# Patient Record
Sex: MALE | Race: WHITE | Employment: OTHER | URBAN - METROPOLITAN AREA
[De-identification: names, ages, dates, MRNs, and addresses within clinical notes are randomized per-mention and may not be internally consistent; named-entity substitution may affect disease eponyms.]

---

## 2017-10-31 PROBLEM — H35.373 MACULAR PUCKERING, BILATERAL: Status: ACTIVE | Noted: 2017-10-31

## 2017-10-31 PROBLEM — H02.831 DERMATOCHALASIS OF BOTH UPPER EYELIDS: Status: ACTIVE | Noted: 2017-10-31

## 2017-10-31 PROBLEM — H04.123 DRY EYE SYNDROME OF BILATERAL LACRIMAL GLANDS: Status: ACTIVE | Noted: 2017-10-31

## 2017-10-31 PROBLEM — H01.00A BLEPHARITIS OF UPPER AND LOWER EYELIDS OF BOTH EYES, UNSPECIFIED TYPE: Status: ACTIVE | Noted: 2017-10-31

## 2017-10-31 PROBLEM — H02.834 DERMATOCHALASIS OF BOTH UPPER EYELIDS: Status: ACTIVE | Noted: 2017-10-31

## 2017-10-31 PROBLEM — H01.00B BLEPHARITIS OF UPPER AND LOWER EYELIDS OF BOTH EYES, UNSPECIFIED TYPE: Status: ACTIVE | Noted: 2017-10-31

## 2017-10-31 PROBLEM — H25.13 NUCLEAR SCLEROTIC CATARACT OF BOTH EYES: Status: ACTIVE | Noted: 2017-10-31

## 2024-07-16 ENCOUNTER — APPOINTMENT (OUTPATIENT)
Dept: CT IMAGING | Facility: HOSPITAL | Age: 84
End: 2024-07-16
Attending: STUDENT IN AN ORGANIZED HEALTH CARE EDUCATION/TRAINING PROGRAM
Payer: MEDICARE

## 2024-07-16 ENCOUNTER — APPOINTMENT (OUTPATIENT)
Dept: CT IMAGING | Facility: HOSPITAL | Age: 84
End: 2024-07-16
Attending: NEUROLOGICAL SURGERY
Payer: MEDICARE

## 2024-07-16 ENCOUNTER — APPOINTMENT (OUTPATIENT)
Dept: INTERVENTIONAL RADIOLOGY/VASCULAR | Facility: HOSPITAL | Age: 84
End: 2024-07-16
Attending: NEUROLOGICAL SURGERY
Payer: MEDICARE

## 2024-07-16 ENCOUNTER — ANESTHESIA EVENT (OUTPATIENT)
Dept: INTERVENTIONAL RADIOLOGY/VASCULAR | Facility: HOSPITAL | Age: 84
End: 2024-07-16
Payer: MEDICARE

## 2024-07-16 ENCOUNTER — HOSPITAL ENCOUNTER (INPATIENT)
Facility: HOSPITAL | Age: 84
LOS: 4 days | Discharge: HOME HEALTH | End: 2024-07-20
Attending: STUDENT IN AN ORGANIZED HEALTH CARE EDUCATION/TRAINING PROGRAM | Admitting: HOSPITALIST
Payer: MEDICARE

## 2024-07-16 ENCOUNTER — APPOINTMENT (OUTPATIENT)
Dept: GENERAL RADIOLOGY | Facility: HOSPITAL | Age: 84
End: 2024-07-16
Attending: STUDENT IN AN ORGANIZED HEALTH CARE EDUCATION/TRAINING PROGRAM
Payer: MEDICARE

## 2024-07-16 ENCOUNTER — OFFICE VISIT (OUTPATIENT)
Dept: FAMILY MEDICINE CLINIC | Facility: CLINIC | Age: 84
End: 2024-07-16
Payer: MEDICARE

## 2024-07-16 VITALS
DIASTOLIC BLOOD PRESSURE: 58 MMHG | SYSTOLIC BLOOD PRESSURE: 106 MMHG | HEIGHT: 64 IN | TEMPERATURE: 98 F | OXYGEN SATURATION: 100 % | HEART RATE: 60 BPM | WEIGHT: 129 LBS | BODY MASS INDEX: 22.02 KG/M2 | RESPIRATION RATE: 16 BRPM

## 2024-07-16 DIAGNOSIS — I63.9 ACUTE CVA (CEREBROVASCULAR ACCIDENT) (HCC): Primary | ICD-10-CM

## 2024-07-16 DIAGNOSIS — H60.541 ACUTE ECZEMATOID OTITIS EXTERNA OF RIGHT EAR: ICD-10-CM

## 2024-07-16 DIAGNOSIS — H61.21 IMPACTED CERUMEN OF RIGHT EAR: Primary | ICD-10-CM

## 2024-07-16 LAB
ALBUMIN SERPL-MCNC: 4.3 G/DL (ref 3.2–4.8)
ALBUMIN/GLOB SERPL: 1.6 {RATIO} (ref 1–2)
ALP LIVER SERPL-CCNC: 104 U/L
ALT SERPL-CCNC: 24 U/L
ANION GAP SERPL CALC-SCNC: 8.2 MMOL/L (ref 0–18)
ANTIBODY SCREEN: NEGATIVE
APTT PPP: 29.4 SECONDS (ref 23–36)
AST SERPL-CCNC: 26 U/L (ref ?–34)
BASOPHILS # BLD AUTO: 0.04 X10(3) UL (ref 0–0.2)
BASOPHILS NFR BLD AUTO: 0.7 %
BILIRUB SERPL-MCNC: 0.5 MG/DL (ref 0.2–1.1)
BUN BLD-MCNC: 20 MG/DL (ref 9–23)
CALCIUM BLD-MCNC: 9.1 MG/DL (ref 8.7–10.4)
CHLORIDE SERPL-SCNC: 103 MMOL/L (ref 98–112)
CHOLEST SERPL-MCNC: 204 MG/DL (ref ?–200)
CO2 SERPL-SCNC: 26.8 MMOL/L (ref 21–32)
CREAT BLD-MCNC: 1.22 MG/DL
EGFRCR SERPLBLD CKD-EPI 2021: 59 ML/MIN/1.73M2 (ref 60–?)
EOSINOPHIL # BLD AUTO: 0.19 X10(3) UL (ref 0–0.7)
EOSINOPHIL NFR BLD AUTO: 3.1 %
ERYTHROCYTE [DISTWIDTH] IN BLOOD BY AUTOMATED COUNT: 12.9 %
EST. AVERAGE GLUCOSE BLD GHB EST-MCNC: 111 MG/DL (ref 68–126)
GLOBULIN PLAS-MCNC: 2.7 G/DL (ref 2.8–4.4)
GLUCOSE BLD-MCNC: 104 MG/DL (ref 70–99)
GLUCOSE BLD-MCNC: 99 MG/DL (ref 70–99)
HBA1C MFR BLD: 5.5 % (ref ?–5.7)
HCT VFR BLD AUTO: 39.3 %
HDLC SERPL-MCNC: 52 MG/DL (ref 40–59)
HGB BLD-MCNC: 13.6 G/DL
IMM GRANULOCYTES # BLD AUTO: 0 X10(3) UL (ref 0–1)
IMM GRANULOCYTES NFR BLD: 0 %
INR BLD: 1.05 (ref 0.8–1.2)
LDLC SERPL CALC-MCNC: 137 MG/DL (ref ?–100)
LYMPHOCYTES # BLD AUTO: 2.11 X10(3) UL (ref 1–4)
LYMPHOCYTES NFR BLD AUTO: 34.4 %
MCH RBC QN AUTO: 31.3 PG (ref 26–34)
MCHC RBC AUTO-ENTMCNC: 34.6 G/DL (ref 31–37)
MCV RBC AUTO: 90.3 FL
MONOCYTES # BLD AUTO: 0.57 X10(3) UL (ref 0.1–1)
MONOCYTES NFR BLD AUTO: 9.3 %
NEUTROPHILS # BLD AUTO: 3.22 X10 (3) UL (ref 1.5–7.7)
NEUTROPHILS # BLD AUTO: 3.22 X10(3) UL (ref 1.5–7.7)
NEUTROPHILS NFR BLD AUTO: 52.5 %
NONHDLC SERPL-MCNC: 152 MG/DL (ref ?–130)
OSMOLALITY SERPL CALC.SUM OF ELEC: 289 MOSM/KG (ref 275–295)
PLATELET # BLD AUTO: 151 10(3)UL (ref 150–450)
POTASSIUM SERPL-SCNC: 3.7 MMOL/L (ref 3.5–5.1)
PROT SERPL-MCNC: 7 G/DL (ref 5.7–8.2)
PROTHROMBIN TIME: 13.7 SECONDS (ref 11.6–14.8)
RBC # BLD AUTO: 4.35 X10(6)UL
RH BLOOD TYPE: POSITIVE
RH BLOOD TYPE: POSITIVE
SODIUM SERPL-SCNC: 138 MMOL/L (ref 136–145)
TRIGL SERPL-MCNC: 81 MG/DL (ref 30–149)
TROPONIN I SERPL HS-MCNC: 8.22 NG/L
VLDLC SERPL CALC-MCNC: 15 MG/DL (ref 0–30)
WBC # BLD AUTO: 6.1 X10(3) UL (ref 4–11)

## 2024-07-16 PROCEDURE — 99292 CRITICAL CARE ADDL 30 MIN: CPT | Performed by: INTERNAL MEDICINE

## 2024-07-16 PROCEDURE — 70496 CT ANGIOGRAPHY HEAD: CPT | Performed by: STUDENT IN AN ORGANIZED HEALTH CARE EDUCATION/TRAINING PROGRAM

## 2024-07-16 PROCEDURE — 99202 OFFICE O/P NEW SF 15 MIN: CPT | Performed by: NURSE PRACTITIONER

## 2024-07-16 PROCEDURE — 71045 X-RAY EXAM CHEST 1 VIEW: CPT | Performed by: STUDENT IN AN ORGANIZED HEALTH CARE EDUCATION/TRAINING PROGRAM

## 2024-07-16 PROCEDURE — 70498 CT ANGIOGRAPHY NECK: CPT | Performed by: STUDENT IN AN ORGANIZED HEALTH CARE EDUCATION/TRAINING PROGRAM

## 2024-07-16 PROCEDURE — B3181ZZ FLUOROSCOPY OF BILATERAL INTERNAL CAROTID ARTERIES USING LOW OSMOLAR CONTRAST: ICD-10-PCS | Performed by: NEUROLOGICAL SURGERY

## 2024-07-16 PROCEDURE — 3E03317 INTRODUCTION OF OTHER THROMBOLYTIC INTO PERIPHERAL VEIN, PERCUTANEOUS APPROACH: ICD-10-PCS | Performed by: STUDENT IN AN ORGANIZED HEALTH CARE EDUCATION/TRAINING PROGRAM

## 2024-07-16 PROCEDURE — 99291 CRITICAL CARE FIRST HOUR: CPT | Performed by: INTERNAL MEDICINE

## 2024-07-16 PROCEDURE — 70450 CT HEAD/BRAIN W/O DYE: CPT | Performed by: STUDENT IN AN ORGANIZED HEALTH CARE EDUCATION/TRAINING PROGRAM

## 2024-07-16 PROCEDURE — 99223 1ST HOSP IP/OBS HIGH 75: CPT | Performed by: HOSPITALIST

## 2024-07-16 PROCEDURE — 0042T CT STROKE (DAWN) CTA BRAIN/CTA NECK+PERF(CPT=70496/70498/0042T): CPT | Performed by: STUDENT IN AN ORGANIZED HEALTH CARE EDUCATION/TRAINING PROGRAM

## 2024-07-16 PROCEDURE — 70450 CT HEAD/BRAIN W/O DYE: CPT | Performed by: NEUROLOGICAL SURGERY

## 2024-07-16 PROCEDURE — 99291 CRITICAL CARE FIRST HOUR: CPT | Performed by: NEUROLOGICAL SURGERY

## 2024-07-16 PROCEDURE — 03CG3ZZ EXTIRPATION OF MATTER FROM INTRACRANIAL ARTERY, PERCUTANEOUS APPROACH: ICD-10-PCS | Performed by: NEUROLOGICAL SURGERY

## 2024-07-16 RX ORDER — MELATONIN
3 NIGHTLY
COMMUNITY

## 2024-07-16 RX ORDER — ONDANSETRON 2 MG/ML
4 INJECTION INTRAMUSCULAR; INTRAVENOUS EVERY 6 HOURS PRN
Status: DISCONTINUED | OUTPATIENT
Start: 2024-07-16 | End: 2024-07-20

## 2024-07-16 RX ORDER — POTASSIUM CHLORIDE 20 MEQ/1
40 TABLET, EXTENDED RELEASE ORAL ONCE
Status: COMPLETED | OUTPATIENT
Start: 2024-07-16 | End: 2024-07-17

## 2024-07-16 RX ORDER — ONDANSETRON 2 MG/ML
4 INJECTION INTRAMUSCULAR; INTRAVENOUS ONCE AS NEEDED
Status: DISCONTINUED | OUTPATIENT
Start: 2024-07-16 | End: 2024-07-16

## 2024-07-16 RX ORDER — ALPRAZOLAM 0.25 MG/1
0.25 TABLET ORAL 3 TIMES DAILY PRN
Status: DISCONTINUED | OUTPATIENT
Start: 2024-07-16 | End: 2024-07-20

## 2024-07-16 RX ORDER — GABAPENTIN 300 MG/1
300 CAPSULE ORAL 3 TIMES DAILY
Status: DISCONTINUED | OUTPATIENT
Start: 2024-07-16 | End: 2024-07-16

## 2024-07-16 RX ORDER — POTASSIUM CHLORIDE 20 MEQ/1
TABLET, EXTENDED RELEASE ORAL
Status: COMPLETED
Start: 2024-07-16 | End: 2024-07-16

## 2024-07-16 RX ORDER — QUETIAPINE FUMARATE 25 MG/1
25 TABLET, FILM COATED ORAL NIGHTLY
Status: DISCONTINUED | OUTPATIENT
Start: 2024-07-16 | End: 2024-07-20

## 2024-07-16 RX ORDER — MELATONIN
3 NIGHTLY PRN
Status: DISCONTINUED | OUTPATIENT
Start: 2024-07-16 | End: 2024-07-20

## 2024-07-16 RX ORDER — PHENYLEPHRINE HCL 10 MG/ML
VIAL (ML) INJECTION AS NEEDED
Status: DISCONTINUED | OUTPATIENT
Start: 2024-07-16 | End: 2024-07-16 | Stop reason: SURG

## 2024-07-16 RX ORDER — DEXAMETHASONE SODIUM PHOSPHATE 4 MG/ML
4 VIAL (ML) INJECTION ONCE AS NEEDED
Status: DISCONTINUED | OUTPATIENT
Start: 2024-07-16 | End: 2024-07-16

## 2024-07-16 RX ORDER — LABETALOL HYDROCHLORIDE 5 MG/ML
10 INJECTION, SOLUTION INTRAVENOUS EVERY 10 MIN PRN
Status: DISCONTINUED | OUTPATIENT
Start: 2024-07-16 | End: 2024-07-16

## 2024-07-16 RX ORDER — FAMOTIDINE 10 MG/ML
20 INJECTION, SOLUTION INTRAVENOUS ONCE AS NEEDED
Status: ACTIVE | OUTPATIENT
Start: 2024-07-16 | End: 2024-07-16

## 2024-07-16 RX ORDER — ONDANSETRON 2 MG/ML
4 INJECTION INTRAMUSCULAR; INTRAVENOUS EVERY 6 HOURS PRN
Status: DISCONTINUED | OUTPATIENT
Start: 2024-07-16 | End: 2024-07-16

## 2024-07-16 RX ORDER — SODIUM CHLORIDE 9 MG/ML
INJECTION, SOLUTION INTRAVENOUS CONTINUOUS PRN
Status: DISCONTINUED | OUTPATIENT
Start: 2024-07-16 | End: 2024-07-16 | Stop reason: SURG

## 2024-07-16 RX ORDER — FAMOTIDINE 10 MG/ML
20 INJECTION, SOLUTION INTRAVENOUS ONCE AS NEEDED
Status: DISCONTINUED | OUTPATIENT
Start: 2024-07-16 | End: 2024-07-16

## 2024-07-16 RX ORDER — METOCLOPRAMIDE HYDROCHLORIDE 5 MG/ML
10 INJECTION INTRAMUSCULAR; INTRAVENOUS EVERY 8 HOURS PRN
Status: DISCONTINUED | OUTPATIENT
Start: 2024-07-16 | End: 2024-07-16

## 2024-07-16 RX ORDER — MEXILETINE HYDROCHLORIDE 150 MG/1
150 CAPSULE ORAL 3 TIMES DAILY
Status: DISCONTINUED | OUTPATIENT
Start: 2024-07-16 | End: 2024-07-20

## 2024-07-16 RX ORDER — DIPHENHYDRAMINE HYDROCHLORIDE 50 MG/ML
12.5 INJECTION INTRAMUSCULAR; INTRAVENOUS AS NEEDED
Status: ACTIVE | OUTPATIENT
Start: 2024-07-16 | End: 2024-07-17

## 2024-07-16 RX ORDER — MEMANTINE HYDROCHLORIDE 10 MG/1
10 TABLET ORAL 2 TIMES DAILY
COMMUNITY

## 2024-07-16 RX ORDER — IODIXANOL 320 MG/ML
150 INJECTION, SOLUTION INTRAVASCULAR
Status: COMPLETED | OUTPATIENT
Start: 2024-07-16 | End: 2024-07-16

## 2024-07-16 RX ORDER — NOREPINEPHRINE BITARTRATE 1 MG/ML
INJECTION, SOLUTION INTRAVENOUS
Status: DISCONTINUED
Start: 2024-07-16 | End: 2024-07-16 | Stop reason: WASHOUT

## 2024-07-16 RX ORDER — LABETALOL HYDROCHLORIDE 5 MG/ML
5 INJECTION, SOLUTION INTRAVENOUS EVERY 5 MIN PRN
Status: DISCONTINUED | OUTPATIENT
Start: 2024-07-16 | End: 2024-07-16

## 2024-07-16 RX ORDER — OFLOXACIN 3 MG/ML
5 SOLUTION AURICULAR (OTIC) 2 TIMES DAILY
Qty: 5 ML | Refills: 0 | Status: SHIPPED | OUTPATIENT
Start: 2024-07-16 | End: 2024-07-21

## 2024-07-16 RX ORDER — ATORVASTATIN CALCIUM 40 MG/1
40 TABLET, FILM COATED ORAL NIGHTLY
Status: DISCONTINUED | OUTPATIENT
Start: 2024-07-16 | End: 2024-07-16

## 2024-07-16 RX ORDER — MELATONIN
1000 DAILY
COMMUNITY

## 2024-07-16 RX ORDER — MEMANTINE HYDROCHLORIDE 5 MG/1
TABLET ORAL
Status: COMPLETED
Start: 2024-07-16 | End: 2024-07-16

## 2024-07-16 RX ORDER — ACETAMINOPHEN 650 MG/1
650 SUPPOSITORY RECTAL EVERY 4 HOURS PRN
Status: DISCONTINUED | OUTPATIENT
Start: 2024-07-16 | End: 2024-07-16

## 2024-07-16 RX ORDER — ECHINACEA PURPUREA EXTRACT 125 MG
1 TABLET ORAL
Status: DISCONTINUED | OUTPATIENT
Start: 2024-07-16 | End: 2024-07-20

## 2024-07-16 RX ORDER — DIVALPROEX SODIUM 125 MG/1
125 TABLET, DELAYED RELEASE ORAL 3 TIMES DAILY
COMMUNITY

## 2024-07-16 RX ORDER — MELATONIN
3 NIGHTLY
Status: DISCONTINUED | OUTPATIENT
Start: 2024-07-16 | End: 2024-07-20

## 2024-07-16 RX ORDER — SODIUM CHLORIDE 9 MG/ML
INJECTION, SOLUTION INTRAVENOUS CONTINUOUS
Status: DISCONTINUED | OUTPATIENT
Start: 2024-07-16 | End: 2024-07-16

## 2024-07-16 RX ORDER — POTASSIUM CHLORIDE 750 MG/1
10 TABLET, FILM COATED, EXTENDED RELEASE ORAL DAILY
COMMUNITY

## 2024-07-16 RX ORDER — ACETAMINOPHEN 325 MG/1
650 TABLET ORAL EVERY 4 HOURS PRN
Status: DISCONTINUED | OUTPATIENT
Start: 2024-07-16 | End: 2024-07-20

## 2024-07-16 RX ORDER — ACETAMINOPHEN 325 MG/1
650 TABLET ORAL EVERY 4 HOURS PRN
Status: DISCONTINUED | OUTPATIENT
Start: 2024-07-16 | End: 2024-07-16

## 2024-07-16 RX ORDER — METHYLPREDNISOLONE SODIUM SUCCINATE 125 MG/2ML
125 INJECTION, POWDER, LYOPHILIZED, FOR SOLUTION INTRAMUSCULAR; INTRAVENOUS ONCE AS NEEDED
Status: DISCONTINUED | OUTPATIENT
Start: 2024-07-16 | End: 2024-07-16

## 2024-07-16 RX ORDER — AMIODARONE HYDROCHLORIDE 200 MG/1
200 TABLET ORAL DAILY
COMMUNITY

## 2024-07-16 RX ORDER — DOCUSATE SODIUM 100 MG/1
100 CAPSULE, LIQUID FILLED ORAL 2 TIMES DAILY
COMMUNITY

## 2024-07-16 RX ORDER — ACETAMINOPHEN 500 MG
500 TABLET ORAL 3 TIMES DAILY
COMMUNITY

## 2024-07-16 RX ORDER — BENZONATATE 100 MG/1
200 CAPSULE ORAL 3 TIMES DAILY PRN
Status: DISCONTINUED | OUTPATIENT
Start: 2024-07-16 | End: 2024-07-20

## 2024-07-16 RX ORDER — SODIUM CHLORIDE 9 MG/ML
INJECTION, SOLUTION INTRAVENOUS CONTINUOUS
Status: DISCONTINUED | OUTPATIENT
Start: 2024-07-16 | End: 2024-07-20

## 2024-07-16 RX ORDER — ASPIRIN 325 MG
325 TABLET, DELAYED RELEASE (ENTERIC COATED) ORAL DAILY
Status: DISCONTINUED | OUTPATIENT
Start: 2024-07-16 | End: 2024-07-16

## 2024-07-16 RX ORDER — DIPHENHYDRAMINE HYDROCHLORIDE 50 MG/ML
50 INJECTION INTRAMUSCULAR; INTRAVENOUS ONCE AS NEEDED
Status: DISCONTINUED | OUTPATIENT
Start: 2024-07-16 | End: 2024-07-16

## 2024-07-16 RX ORDER — ASPIRIN 300 MG/1
150 SUPPOSITORY RECTAL DAILY
Status: DISCONTINUED | OUTPATIENT
Start: 2024-07-16 | End: 2024-07-17

## 2024-07-16 RX ORDER — MEMANTINE HYDROCHLORIDE 10 MG/1
10 TABLET ORAL 2 TIMES DAILY
Status: DISCONTINUED | OUTPATIENT
Start: 2024-07-16 | End: 2024-07-20

## 2024-07-16 RX ORDER — NALOXONE HYDROCHLORIDE 0.4 MG/ML
0.08 INJECTION, SOLUTION INTRAMUSCULAR; INTRAVENOUS; SUBCUTANEOUS AS NEEDED
Status: ACTIVE | OUTPATIENT
Start: 2024-07-16 | End: 2024-07-17

## 2024-07-16 RX ORDER — DIVALPROEX SODIUM 125 MG/1
125 TABLET, DELAYED RELEASE ORAL 3 TIMES DAILY
Status: DISCONTINUED | OUTPATIENT
Start: 2024-07-16 | End: 2024-07-20

## 2024-07-16 RX ORDER — ACETAMINOPHEN 500 MG
500 TABLET ORAL EVERY 4 HOURS PRN
Status: DISCONTINUED | OUTPATIENT
Start: 2024-07-16 | End: 2024-07-16

## 2024-07-16 RX ORDER — ASPIRIN 300 MG/1
300 SUPPOSITORY RECTAL DAILY
Status: DISCONTINUED | OUTPATIENT
Start: 2024-07-16 | End: 2024-07-16

## 2024-07-16 RX ORDER — METOCLOPRAMIDE HYDROCHLORIDE 5 MG/ML
5 INJECTION INTRAMUSCULAR; INTRAVENOUS EVERY 8 HOURS PRN
Status: DISCONTINUED | OUTPATIENT
Start: 2024-07-16 | End: 2024-07-16

## 2024-07-16 RX ORDER — BISACODYL 10 MG
10 SUPPOSITORY, RECTAL RECTAL
Status: DISCONTINUED | OUTPATIENT
Start: 2024-07-16 | End: 2024-07-20

## 2024-07-16 RX ORDER — SODIUM CHLORIDE, SODIUM LACTATE, POTASSIUM CHLORIDE, CALCIUM CHLORIDE 600; 310; 30; 20 MG/100ML; MG/100ML; MG/100ML; MG/100ML
INJECTION, SOLUTION INTRAVENOUS CONTINUOUS
Status: DISCONTINUED | OUTPATIENT
Start: 2024-07-16 | End: 2024-07-16

## 2024-07-16 RX ORDER — MEXILETINE HYDROCHLORIDE 150 MG/1
150 CAPSULE ORAL 3 TIMES DAILY
COMMUNITY

## 2024-07-16 RX ORDER — AMIODARONE HYDROCHLORIDE 200 MG/1
200 TABLET ORAL DAILY
Status: DISCONTINUED | OUTPATIENT
Start: 2024-07-17 | End: 2024-07-20

## 2024-07-16 RX ORDER — HEPARIN SODIUM 5000 [USP'U]/ML
INJECTION, SOLUTION INTRAVENOUS; SUBCUTANEOUS
Status: COMPLETED
Start: 2024-07-16 | End: 2024-07-16

## 2024-07-16 RX ORDER — METHYLPREDNISOLONE SODIUM SUCCINATE 125 MG/2ML
125 INJECTION, POWDER, LYOPHILIZED, FOR SOLUTION INTRAMUSCULAR; INTRAVENOUS ONCE AS NEEDED
Status: ACTIVE | OUTPATIENT
Start: 2024-07-16 | End: 2024-07-16

## 2024-07-16 RX ORDER — ONDANSETRON 2 MG/ML
8 INJECTION INTRAMUSCULAR; INTRAVENOUS EVERY 6 HOURS PRN
Status: DISCONTINUED | OUTPATIENT
Start: 2024-07-16 | End: 2024-07-16

## 2024-07-16 RX ORDER — POLYETHYLENE GLYCOL 3350 17 G/17G
17 POWDER, FOR SOLUTION ORAL DAILY PRN
Status: DISCONTINUED | OUTPATIENT
Start: 2024-07-16 | End: 2024-07-20

## 2024-07-16 RX ORDER — QUETIAPINE FUMARATE 25 MG/1
25 TABLET, FILM COATED ORAL NIGHTLY
COMMUNITY

## 2024-07-16 RX ORDER — ACETAMINOPHEN 650 MG/1
650 SUPPOSITORY RECTAL EVERY 4 HOURS PRN
Status: DISCONTINUED | OUTPATIENT
Start: 2024-07-16 | End: 2024-07-20

## 2024-07-16 RX ORDER — LABETALOL HYDROCHLORIDE 5 MG/ML
10 INJECTION, SOLUTION INTRAVENOUS EVERY 10 MIN PRN
Status: DISCONTINUED | OUTPATIENT
Start: 2024-07-16 | End: 2024-07-17

## 2024-07-16 RX ORDER — FUROSEMIDE 20 MG/1
20 TABLET ORAL DAILY
COMMUNITY

## 2024-07-16 RX ORDER — ATORVASTATIN CALCIUM 40 MG/1
40 TABLET, FILM COATED ORAL NIGHTLY
Status: DISCONTINUED | OUTPATIENT
Start: 2024-07-16 | End: 2024-07-20

## 2024-07-16 RX ORDER — HYDRALAZINE HYDROCHLORIDE 20 MG/ML
10 INJECTION INTRAMUSCULAR; INTRAVENOUS EVERY 2 HOUR PRN
Status: DISCONTINUED | OUTPATIENT
Start: 2024-07-16 | End: 2024-07-16

## 2024-07-16 RX ORDER — DIPHENHYDRAMINE HYDROCHLORIDE 50 MG/ML
50 INJECTION INTRAMUSCULAR; INTRAVENOUS ONCE AS NEEDED
Status: ACTIVE | OUTPATIENT
Start: 2024-07-16 | End: 2024-07-16

## 2024-07-16 RX ORDER — QUETIAPINE FUMARATE 50 MG/1
50 TABLET, EXTENDED RELEASE ORAL NIGHTLY
COMMUNITY

## 2024-07-16 RX ORDER — HYDRALAZINE HYDROCHLORIDE 20 MG/ML
10 INJECTION INTRAMUSCULAR; INTRAVENOUS EVERY 2 HOUR PRN
Status: DISCONTINUED | OUTPATIENT
Start: 2024-07-16 | End: 2024-07-17

## 2024-07-16 RX ORDER — SENNOSIDES 8.6 MG
17.2 TABLET ORAL NIGHTLY PRN
Status: DISCONTINUED | OUTPATIENT
Start: 2024-07-16 | End: 2024-07-20

## 2024-07-16 RX ADMIN — PHENYLEPHRINE HCL 100 MCG: 10 MG/ML VIAL (ML) INJECTION at 17:49:00

## 2024-07-16 RX ADMIN — PHENYLEPHRINE HCL 100 MCG: 10 MG/ML VIAL (ML) INJECTION at 17:32:00

## 2024-07-16 RX ADMIN — SODIUM CHLORIDE: 9 INJECTION, SOLUTION INTRAVENOUS at 17:01:00

## 2024-07-16 RX ADMIN — PHENYLEPHRINE HCL 100 MCG: 10 MG/ML VIAL (ML) INJECTION at 17:47:00

## 2024-07-16 NOTE — PROGRESS NOTES
Patient presents to clinic for wax in right ear. He was seen at another clinic for wax removal in preparation for hearing aid fitting and wax was not fully removed. He denies pain but notes hearing loss. Has tried nothing at home for treatment.  Past Medical History:    Age-related nuclear cataract, bilateral    Cataract    Dry eye    H/O cardiac radiofrequency ablation    @ [facility]    Heart disease    History of permanent cardiac pacemaker placement    Hyperlipidemia    Macular pucker    Posterior vitreous detachment    Ptosis       No past surgical history on file.  Current Outpatient Medications on File Prior to Visit   Medication Sig Dispense Refill    gabapentin 300 MG Oral Cap Take 300 mg by mouth 3 (three) times daily.      metoprolol tartrate 10mg/ml Oral Suspension       diazepam (VALIUM) 10 MG Oral Tab       Hydrocodone-Acetaminophen (VICODIN OR)       Atorvastatin Calcium (LIPITOR OR) Take by mouth.      amiodarone 5mg/ml Oral Suspension        No current facility-administered medications on file prior to visit.     /58   Pulse 60   Temp 97.7 °F (36.5 °C)   Resp 16   Ht 5' 4\" (1.626 m)   Wt 129 lb (58.5 kg)   SpO2 100%   BMI 22.14 kg/m²     GENERAL: well developed, well nourished,in no apparent distress  SKIN: no rashes,no suspicious lesions  HEENT: atraumatic, normocephalic. Bilateral ears with cerumen present.   NEURO: CN 2-12 grossly intact.  Procedure:  The patient has impacted cerumen which covers bilater eardrums and pt has complaints of change in hearing. After risks and benefits discussed and verbal consent given the impacted cerumen is removed with curette and water suction with good results. Right canal with white waxy debris noted and mild swelling in canal. Cotton placed in ear canal for drainage and comfort. Patient tolerated procedure well. Discussed conservative methods of cerumen management with patient.   Montrell was seen today for ear problem.    Diagnoses and all  orders for this visit:    Impacted cerumen of right ear    Acute eczematoid otitis externa of right ear    Other orders  -     ofloxacin 0.3 % Otic Solution; Place 5 drops into the right ear 2 (two) times daily for 5 days.      Patient Instructions   Please use Ofloxin drops to RIGHT ear twice daily for five days.  Follow up with hearing specialist as needed.      Patient verbalized understanding and agrees to plan.

## 2024-07-16 NOTE — ANESTHESIA PREPROCEDURE EVALUATION
PRE-OP EVALUATION    Patient Name: Montrell Roberto    Admit Diagnosis: Acute CVA (cerebrovascular accident) (HCC) [I63.9]    Pre-op Diagnosis: * No pre-op diagnosis entered *        Anesthesia Procedure: IR INTRA ARTERIAL STROKE INTERVENTION    * No surgeons found in log *    Pre-op vitals reviewed.  Temp: 97.7 °F (36.5 °C)  Pulse: 72  Resp: 18  BP: 111/63  SpO2: 98 %  Body mass index is 22.36 kg/m².    Current medications reviewed.  Hospital Medications:   sodium chloride 0.9% infusion   Intravenous Continuous    [COMPLETED] tenecteplase (TNKase) injection 15 mg  0.25 mg/kg Intravenous Once    [COMPLETED] heparin (Porcine) 5000 UNIT/ML injection        [COMPLETED] niCARdipine in sodium chloride 0.86% (carDENE) 20 mg/200mL infusion premix        [COMPLETED] iopamidol 76% (ISOVUE-370) injection for power injector  125 mL Intravenous ONCE PRN       Outpatient Medications:     Medications Prior to Admission   Medication Sig Dispense Refill Last Dose    ofloxacin 0.3 % Otic Solution Place 5 drops into the right ear 2 (two) times daily for 5 days. 5 mL 0     gabapentin 300 MG Oral Cap Take 300 mg by mouth 3 (three) times daily.       metoprolol tartrate 10mg/ml Oral Suspension        diazepam (VALIUM) 10 MG Oral Tab        Hydrocodone-Acetaminophen (VICODIN OR)        Atorvastatin Calcium (LIPITOR OR) Take by mouth.       amiodarone 5mg/ml Oral Suspension           Allergies: Other      Anesthesia Evaluation    Patient summary reviewed.    Anesthetic Complications  (-) history of anesthetic complications         GI/Hepatic/Renal    Negative GI/hepatic/renal ROS.                             Cardiovascular        Exercise tolerance: good     MET: >4                 (+) pacemaker/AICD                          Endo/Other    Negative endo/other ROS.                              Pulmonary    Negative pulmonary ROS.                       Neuro/Psych          (+) CVA                            History reviewed. No pertinent  surgical history.  Social History     Socioeconomic History    Marital status:    Tobacco Use    Smoking status: Never    Smokeless tobacco: Never   Substance and Sexual Activity    Alcohol use: No    Drug use: No     History   Drug Use No     Available pre-op labs reviewed.  Lab Results   Component Value Date    WBC 6.1 07/16/2024    RBC 4.35 07/16/2024    HGB 13.6 07/16/2024    HCT 39.3 07/16/2024    MCV 90.3 07/16/2024    MCH 31.3 07/16/2024    MCHC 34.6 07/16/2024    RDW 12.9 07/16/2024    .0 07/16/2024        Lab Results   Component Value Date    INR 1.05 07/16/2024         Airway      Mallampati: II  Mouth opening: >3 FB  TM distance: > 6 cm  Neck ROM: full Cardiovascular      Rhythm: regular  Rate: normal  (-) murmur   Dental             Pulmonary    Pulmonary exam normal.  Breath sounds clear to auscultation bilaterally.               Other findings              ASA: 4 and emergent  Plan: MAC   Patient does not meet NPO guidelines.        Comment: Implied consent - emergent procedure  Plan/risks discussed with: patient and Other                Present on Admission:  **None**

## 2024-07-16 NOTE — ED INITIAL ASSESSMENT (HPI)
Per EMS, last known well 07/16/2024 at 340 PM today, was walking around store with wife just fine. Got home and all of a sudden started slurring words, having left sided facial droop and arm weakness. EMS got there and patient was completely aphasic.

## 2024-07-16 NOTE — CONSULTS
[facility] [place]  Neurological Surgery Consultation Note    Montrell Roberto Patient Status:  Emergency    1940 MRN [id number]   [facility] INTERVENTIONAL SUITES Attending Jaun Singh MD   Hosp Day # 0 PCP Enma Clark MD     REASON FOR CONSULTATION:  Acute stroke    HISTORY OF PRESENT ILLNESS:  Montrell Roberto is a 84 year old male with an extensive cardiac history and a prior HIT+ who presented to the ED with acute onset left sided weakness. LKW 1540. NIHSS 20 initially. TNK administered. Repeat NIHSS 14. Head CT without hemorrhage. CTA with right M1 occlusion.      PAST MEDICAL HISTORY:  Past Medical History:    Age-related nuclear cataract, bilateral    Cataract    Dry eye    H/O cardiac radiofrequency ablation    @ [facility]    Heart disease    History of permanent cardiac pacemaker placement    Hyperlipidemia    Macular pucker    Posterior vitreous detachment    Ptosis       PAST SURGICAL HISTORY:  History reviewed. No pertinent surgical history.    FAMILY HISTORY:  family history includes Cataracts in an other family member.    SOCIAL HISTORY:   reports that he has never smoked. He has never used smokeless tobacco. He reports that he does not drink alcohol and does not use drugs.    ALLERGIES:  Allergies   Allergen Reactions    Heparin OTHER (SEE COMMENTS)     Family unsure, states it was at [facility]       MEDICATIONS:  Medications Prior to Admission   Medication Sig Dispense Refill Last Dose    ofloxacin 0.3 % Otic Solution Place 5 drops into the right ear 2 (two) times daily for 5 days. 5 mL 0     gabapentin 300 MG Oral Cap Take 300 mg by mouth 3 (three) times daily.       metoprolol tartrate 10mg/ml Oral Suspension        diazepam (VALIUM) 10 MG Oral Tab        Hydrocodone-Acetaminophen (VICODIN OR)        Atorvastatin Calcium (LIPITOR OR) Take by mouth.       amiodarone 5mg/ml Oral Suspension         Current  Facility-Administered Medications   Medication Dose Route Frequency    sodium chloride 0.9% infusion   Intravenous Continuous     Facility-Administered Medications Ordered in Other Encounters   Medication Dose Route Frequency    fentaNYL (Sublimaze) 50 mcg/mL injection   Intravenous PRN    phenylephrine (Brendan-Synephrine) 10 MG/ML injection   Intravenous PRN    sodium chloride 0.9% infusion   Intravenous Continuous PRN       REVIEW OF SYSTEMS:  A 10-point system was reviewed.  Pertinent positives and negatives are noted in HPI.      PHYSICAL EXAMINATION:  VITAL SIGNS: /63   Pulse 72   Resp 18   Wt 130 lb 4.7 oz (59.1 kg)   SpO2 98%   BMI 22.36 kg/m²   NEUROLOGICAL:    A&Ox 1, dysarthric  Right facial droop  No movement right leg  Right arm incoordinated, weak but antigravity   Right facial droop  Left neglect    DIAGNOSTIC DATA:   Lab Results   Component Value Date    WBC 6.1 07/16/2024    HGB 13.6 07/16/2024    HCT 39.3 07/16/2024    .0 07/16/2024    CREATSERUM 1.22 07/16/2024    BUN 20 07/16/2024     07/16/2024    K 3.7 07/16/2024     07/16/2024    CO2 26.8 07/16/2024    GLU 99 07/16/2024    CA 9.1 07/16/2024    ALB 4.3 07/16/2024    ALKPHO 104 07/16/2024    BILT 0.5 07/16/2024    TP 7.0 07/16/2024    AST 26 07/16/2024    ALT 24 07/16/2024    PTT 29.4 07/16/2024    INR 1.05 07/16/2024    PTP 13.7 07/16/2024    PGLU 104 07/16/2024       ASSESSMENT:  85yo male with a right M1 occlusion    I spoke to the patient, his wife, and his daughter (POA) about the current clinical situation and the plan of care. We discussed the risks, benefits, alternatives, goals, and expectations of stroke intervention. They agreed with proceeding as planned.    Plan:  Emergent stroke intervention    Ambrosio Godoy MD  Neurological Surgery  [facility]    Critical care time: 30 minutes prior to intervention

## 2024-07-16 NOTE — SIGNIFICANT EVENT
Stroke Alert initiated in ED  Last Know Normal at 1540 today  Pre-morbid MRS 3  Initial NIHSS 20 including L sided weakness/neglect, L sided facial droop, global aphasia, R gaze preference   Patient accompanied to CT dept  Case discussed with Dr Gr (ED), Dr Velez ([facility]), and Dr Godoy ()  Per Dr Velez, patient is a candidate for Tenecteplase  Thrombolytic administered in CT room at 1644  Repeat NIHSS obtained after Tenecteplase administration     NIH Stroke Scale after TNK administration   1a.  Level of consciousness: 0   1b. LOC questions:  0   1c. LOC commands: 0   2.  Best Gaze: 2 - R gaze preference   3. Visual: 0   4. Facial Palsy: 1 - L sided facial droop   5a. Motor left arm: 2 - Some effort against gravity   5b.  Motor right arm: 0   6a. Motor left le - No movement   6b.  Motor right le   7. Limb Ataxia: 0 - CHAZ   8.  Sensory: 2 - L side total sensory loss   9. Best Language:  0   10. Dysarthria: 1 - Slurred speech   11. Extinction and Inattention: 2 - Profound maria esther-inattention (L side)     Total:   14      Other symptoms include N/A    Dr Velez updated on patient’s status, imaging results, and repeat NIHSS  Per Dr Godoy, patient is a candidate for neuro intervention (M1 occlusion)  Spouse and daughter educated on stroke diagnosis, treatment, plan of care, and what to expect during hospitalization; all pertinent questions and concerns addressed  Procedure consents signed by patient's daughter Genesis; all pertinent questions and concerns addressed   Patient transported to NI lab immediately after completion of CT/CTA, CTP imaging     Of note: History and LKW provided by EMS and patient's spouse. Patient was traveling in the car with his wife when he suddenly developed confusion, L sided weakness, and L facial droop. Patient was taken home and spouse called 911. Upon arrival to ED, L sided weakness/neglect, L facial droop, confusion/agitation and global aphasia noted.      Patient was  reassessed after TNK administration. He was able to communicate while in CT dept and his speech further improved after administration of TNK. Upon reassessment, improvement in facial drooping was also noted; however, L sided weakness/neglect remained the same.     Stroke Alert timing affected by: N/A    Please refer to the Stroke Data Flowsheets for additional information and Stroke Alert response times.

## 2024-07-16 NOTE — PATIENT INSTRUCTIONS
Please use Ofloxin drops to RIGHT ear twice daily for five days.  Follow up with hearing specialist as needed.

## 2024-07-16 NOTE — CONSULTS
[facility]  Neurocritical Care Consult Note    Montrell Roberto Patient Status:  Emergency    1940 MRN [id number]   Location [facility] EMERGENCY DEPARTMENT Attending No att. providers found   Hosp Day # 0 PCP LYNDA GARCIA       Reason for Consultation:   Acute L sided weakness    HPI:   Patient is a 84 year old male with a h/o htn, hl, dm2, cad, arrhythmia and dilated cardiomyopathy s/p radiofrequency ablation and PPM, and dementia p/w acute onset L sided weakness and slurred speech c/f acute ischemic stroke . Pt noted by family to have acute onset slurred speech and L sided weakness thus brought to ED where w/u revealed NIHSS 20 and ct/cta with R m1 occlusion, thus given iv tnk and underwent emergent endovascular mechanical thrombectomy with tici 2b revascularization c/b R cervical ica non-flow limiting dissection, and pt was transferred to cnicu for further monitoring.     Past Medical History:    Age-related nuclear cataract, bilateral    Cataract    Dry eye    H/O cardiac radiofrequency ablation    @ [facility]    Heart disease    History of permanent cardiac pacemaker placement    Hyperlipidemia    Macular pucker    Posterior vitreous detachment    Ptosis       History reviewed. No pertinent surgical history.    (Not in a hospital admission)    Allergies   Allergen Reactions    Other UNKNOWN     heprin      Social History     Socioeconomic History    Marital status:    Tobacco Use    Smoking status: Never    Smokeless tobacco: Never   Substance and Sexual Activity    Alcohol use: No    Drug use: No     Family History   Problem Relation Age of Onset    Cataracts Other        Current Meds:  Current Facility-Administered Medications   Medication Dose Route Frequency    tenecteplase (TNKase) 50 MG injection           Review of Systems:     Constitutional:    Denies unusual weight loss or weight gain, fever/chills or night sweats.  HEENT:                Denies  changes in vision or difficulty swallowing.  Pulm:                    Denies dyspnea, cough, or sputum production  Cardiac:               Denies chest pain, palpitations or lower extremity edema.  GI:                         Denies constipation, heartburn or melena.  :                       Denies dysuria or hematuria.  Skin:                     Denies rashes or open areas.  Neuro:                  As per HPI    All other systems were reviewed and are negative.    Vitals:   Temp:  [97.7 °F (36.5 °C)] 97.7 °F (36.5 °C)  Pulse:  [60-70] 70  Resp:  [16] 16  BP: (106-116)/(58-65) 116/65  SpO2:  [97 %-100 %] 97 %  Body mass index is 22.36 kg/m².    Intake/Output:  No intake or output data in the 24 hours ending 07/16/24 1636    Physical Examination:   General- No acute distress  CV- RRR  Resp- CTA Bilat  Neuro-  Mental status- awake and alert, regards and follows commands, oriented x3, speech fluent  Cranial nerves- pupils equally round and reactive to light, extraocular muscles intact, face symmetric, visual fields full  Motor- 5/5 throughout  Sens-  Intact to light touch    Diagnostics:   No results found.    Lab Review   No results for input(s): \"NA\", \"K\", \"CL\", \"CO2\", \"GLU\", \"BUN\", \"CREATSERUM\" in the last 168 hours.  No results for input(s): \"WBC\", \"HGB\", \"PLT\" in the last 168 hours.    Assesment/Plan:     Neuro:  L sided weakness and slurred speech- 2/2 R m1 occlusion.  Pt with risk factors of htn, hl, dm2, cad, arrhythmia and dilated cardiomyopathy.  Now s/p iv tnk, cont post tnk protocol with neurochecks/pt/ot/st.   CT/CTA with R m1 occlusion thus take for emergent endovascular mechanical thrombectomy with tici 2b revascularization c/b R cervical ica non-flow limiting dissection, postop per ROSEY.  Exam with complete resolution of symptoms, back to baseline.   Rpt ct 24hrs post tnk (or sooner if any clinical decompensation) and complete stroke w/u (labs, tte etc).   Cardiac:  Htn/hl/cad/arrhythmia/dilated  cardiomyopathy- sbp goal 100-160 per ROSEY.  Check tte with bubble to eval for source of infarct, cont statin.  Pulmonary:  Stable on RA  Renal:  IVFs, monitor BUN/Cr  GI:  PO as toñito  Heme/ID:  Afebrile, no leukocytosis  Endocrine/Rheum:  Monitor glucose, sliding scale insulin prn  DVT Prophylaxis:  SCD’s    Goals of the Day: neurochecks, post tnk protocol   A total of 80 minutes of critical care time (exclusive of billable procedures) was administered to manage and/or prevent neurologic instability. This involved direct patient intervention, complex decision making, and/or extensive discussions with the patient, family, and clinical staff.    Thank you for allowing me to participate in the care of this patient.     Marito Velez MD, [facility]  Medical Director of System Neurosciences  Chief, Section of Neurocritical Care  [facility] [place]

## 2024-07-16 NOTE — ED QUICK NOTES
Pt is taken to CT with stroke navigator, pct, monitor and nurse. He is awake, vitals stable. Left sided deficit, vision favoring the right side. NIH to follow.

## 2024-07-16 NOTE — ED PROVIDER NOTES
Patient Seen in: [facility]      History     Chief Complaint   Patient presents with    Stroke     Stated Complaint: stroke    Subjective:   HPI    The patient presents to the ED with aphasia, left sided weakness that began approx 40 min ago. He and his wife were at [facility] when she noticed he was drooling and slurring his words. She took him home and called 911 as she was concerned he was having a stroke. Pt on arrival completely aphasic, does move left arm but does not move his left lower extremity.   Pt is not on blood thinners though he has a hx of cardiomyopathy with a PCM and defibrillator in place.     Objective:   Past Medical History:    Age-related nuclear cataract, bilateral    Cataract    Dry eye    H/O cardiac radiofrequency ablation    @ [facility]    Heart disease    History of permanent cardiac pacemaker placement    Hyperlipidemia    Macular pucker    Posterior vitreous detachment    Ptosis              History reviewed. No pertinent surgical history.             Social History     Socioeconomic History    Marital status:    Tobacco Use    Smoking status: Never    Smokeless tobacco: Never   Substance and Sexual Activity    Alcohol use: No    Drug use: No     Social Determinants of Health     Financial Resource Strain: Low Risk  (7/29/2021)    Received from [employer]    Overall Financial Resource Strain (CARDIA)     Difficulty of Paying Living Expenses: Not hard at all   Food Insecurity: No Food Insecurity (7/29/2021)    Received from [employer]    Hunger Vital Sign     Worried About Running Out of Food in the Last Year: Never true     Ran Out of Food in the Last Year: Never true   Transportation Needs: No Transportation Needs (7/29/2021)    Received from [employer]    PRAPARE - Transportation     Lack of Transportation (Medical): No     Lack of Transportation (Non-Medical): No   Housing Stability: Unknown (7/29/2021)    Received from  [facility]    Housing Stability Vital Sign     Unable to Pay for Housing in the Last Year: No     In the last 12 months, was there a time when you did not have a steady place to sleep or slept in a shelter (including now)?: No              Review of Systems    Positive for stated Chief Complaint: Stroke    Other systems are as noted in HPI.  Constitutional and vital signs reviewed.      All other systems reviewed and negative except as noted above.    Physical Exam     ED Triage Vitals [07/16/24 1634]   /65   Pulse 70   Resp 16   Temp    Temp src    SpO2 97 %   O2 Device None (Room air)       Current Vitals:   Vital Signs  BP: 111/63  Pulse: 72  Resp: 18    Oxygen Therapy  SpO2: 98 %  O2 Device: None (Room air)            Physical Exam  Vitals and nursing note reviewed.   Constitutional:       General: He is not in acute distress.     Appearance: Normal appearance.   HENT:      Nose: Nose normal.   Cardiovascular:      Rate and Rhythm: Normal rate and regular rhythm.      Pulses: Normal pulses.   Pulmonary:      Effort: Pulmonary effort is normal.   Abdominal:      General: Abdomen is flat. Bowel sounds are normal. There is no distension.      Palpations: Abdomen is soft.      Tenderness: There is no abdominal tenderness. There is no right CVA tenderness, left CVA tenderness, guarding or rebound.      Hernia: No hernia is present.   Musculoskeletal:         General: No swelling or tenderness. Normal range of motion.      Cervical back: Normal range of motion.   Skin:     General: Skin is warm and dry.   Neurological:      Mental Status: He is alert and oriented to person, place, and time. Mental status is at baseline.      Cranial Nerves: Facial asymmetry present.      Motor: Weakness present.   Psychiatric:         Mood and Affect: Mood normal.               ED Course     Labs Reviewed   POCT GLUCOSE - Abnormal; Notable for the following components:       Result Value    POC Glucose 104 (*)     All other  components within normal limits   PROTHROMBIN TIME (PT) - Normal   PTT, ACTIVATED - Normal   CBC WITH DIFFERENTIAL WITH PLATELET    Narrative:     The following orders were created for panel order CBC With Differential With Platelet.  Procedure                               Abnormality         Status                     ---------                               -----------         ------                     CBC W/ DIFFERENTIAL[[id number]]                              Final result                 Please view results for these tests on the individual orders.   COMP METABOLIC PANEL (14)   TROPONIN I HIGH SENSITIVITY   TYPE AND SCREEN    Narrative:     The following orders were created for panel order Type and screen.  Procedure                               Abnormality         Status                     ---------                               -----------         ------                     ABORH (Blood Type)[[id number]]                               In process                 Antibody Screen[[id number]]                                  In process                   Please view results for these tests on the individual orders.   ABORH (BLOOD TYPE)   ANTIBODY SCREEN   ABORH CONFIRMATION   CBC W/ DIFFERENTIAL                TNK/ NI Documentation:    Date/Time last known well:   N/A    NIHSS on presentation: N/A     Chief Complaint   Patient presents with    Stroke     IV Tenecteplase (TNK) administered: Yes; Risks, benefits and alternatives discussed with the patient and/or family.    Candidate for Endovascular thrombectomy (EVT): Yes     Disposition: There is no disposition on file for this visit.  CT STROKE (ALEX) CTA BRAIN/CTA NECK+PERF(CPT=70496/31996/0042T)    Result Date: 7/16/2024  CONCLUSION:   1. No significant stenosis or aneurysmal dilatation involving the major arterial structures in the neck.  2. Obstruction of the mid right M1 segment with associated marked perfusion abnormality suggesting marked ischemia  throughout the entire right MCA territory.  T-max greater than 6 seconds is 148 mL. Mismatch volume is 137 mL.  This critical result was discussed with Dr. Gr at 1708 hours on 7/16/2024. Read back was performed.    LOCATION:  [facility]   Dictated by (CST): Live Little MD on 7/16/2024 at 5:03 PM     Finalized by (CST): Live Little MD on 7/16/2024 at 5:08 PM       CT STROKE BRAIN (NO IV)(CPT=70450)    Result Date: 7/16/2024  CONCLUSION:  1. No acute intracranial hemorrhage or hydrocephalus. 2. Mild age indeterminate small vessel ischemic disease. If there is clinical concern for acute ischemia/infarction, an MRI of the brain would be recommended for further evaluation.  Critical results were discussed with Dr. Gr at 1643 hours on 7/16/2024. Critical results were read back.   LOCATION:  [facility]   Dictated by (CST): Stromberg, LeRoy, MD on 7/16/2024 at 4:39 PM     Finalized by (CST): Stromberg, LeRoy, MD on 7/16/2024 at 4:44 PM                      MDM      The differential includes the following  Acute CVA, acute intracranial hemorrhage both which are life-threatening etiologies, seizure    Pertinent comorbidities include  As listed above    Pertinent social history includes  As listed above    Labs      Imaging studies  I reviewed the images and my independent interpreation after review is no ICH. Additionaly, I reviewd the radiology report that states the following no ICH. CtA scan of the middle cerebral artery.    External data reviewed    Discussion of management with external providers    ER course  Patient's initial vitals  Admission disposition: 7/16/2024  4:58 PM           4:44 PM  Dr. Sainz of radiology has informed me there is no evidence of ICH on CT head  I have spoken with pt's wife about TNK administration - risks and benefit. She has provided consent.  Pt given TNK in CT by stroke navigator and ED pharmacists   Awaiting CTA results     4:57 PM  Is evidence of LVO patient be taken and I  will    5:09 PM  Neurointerventional to spoken with the patient's wife.  Patient taken and I lab from CT scanner.    A total of 35 minutes of critical care time (exclusive of billable procedures) was administered to manage the patient's critical imaging findings due to his complete vessel occlusion of the middle cerebral artery on CT angio, administration of TNK.  This involved direct patient intervention, complex decision making, and/or extensive discussions with the patient, family, and clinical staff.      Medical Decision Making      Disposition and Plan     Clinical Impression:  1. Acute CVA (cerebrovascular accident) (HCC)         Disposition:  Admit  7/16/2024  4:58 pm    Follow-up:  No follow-up provider specified.        Medications Prescribed:  Current Discharge Medication List                            Hospital Problems       Present on Admission  Date Reviewed: 7/16/2024            ICD-10-CM Noted POA    * (Principal) Acute CVA (cerebrovascular accident) (HCC) I63.9 7/16/2024 Unknown                    thrombectomy (EVT): Yes     Disposition: There is no disposition on file for this visit.  No results found.      MDM      The differential includes the following  Acute CVA, acute intracranial hemorrhage both which are life-threatening etiologies, seizure    Pertinent comorbidities include  As listed above    Pertinent social history includes  As listed above    Labs      Imaging studies  I reviewed the images and my independent interpreation after review is no ICH. Additionaly, I reviewd the radiology report that states the following no ICH. CtA scan of the middle cerebral artery.    External data reviewed    Discussion of management with external providers    ER course  Patient's initial vitals  Admission disposition: 7/16/2024  4:58 PM           4:44 PM  Dr. Sainz of radiology has informed me there is no evidence of ICH on CT head  I have spoken with pt's wife about TNK administration - risks and benefit. She has provided consent.  Pt given TNK in CT by stroke navigator and ED pharmacists   Awaiting CTA results     4:57 PM  Is evidence of LVO patient be taken and I will    5:09 PM  Neurointerventional to spoken with the patient's wife.  Patient taken and I lab from CT scanner.    A total of 35 minutes of critical care time (exclusive of billable procedures) was administered to manage the patient's critical imaging findings due to his complete vessel occlusion of the middle cerebral artery on CT angio, administration of TNK.  This involved direct patient intervention, complex decision making, and/or extensive discussions with the patient, family, and clinical staff.      Medical Decision Making      Disposition and Plan     Clinical Impression:  1. Acute CVA (cerebrovascular accident) (HCC)         Disposition:  Admit  7/16/2024  4:58 pm    Follow-up:  Mariann Wagner, APRN  [address]  [facility] [id number] [phone]    Follow up in 1 month(s)      Enma Clark MD  [address]  [facility] [address]  [phone]    Schedule an appointment as soon as possible for a visit      Leroy Petersen MD  [address]  [place] [address]  [phone]    Go in 1 month(s)  stroke follow up    Ronald Watkins MD  [address] [facility]  [address] [facility] [address]  [phone]    Follow up in 1 week(s)  Office will call you for follow up appt.          Medications Prescribed:  Discharge Medication List as of 7/20/2024 11:54 AM        START taking these medications    Details   aspirin 325 MG Oral Tab Take 1 tablet (325 mg total) by mouth at bedtime., Normal, Disp-30 tablet, R-1                               Hospital Problems       Present on Admission  Date Reviewed: 7/16/2024            ICD-10-CM Noted POA    * (Principal) Acute CVA (cerebrovascular accident) (HCC) I63.9 7/16/2024 Yes    Chronic systolic congestive heart failure (HCC) I50.22 7/18/2024 Unknown

## 2024-07-16 NOTE — ED QUICK NOTES
Dr. Barragan at CT, reviewing CT scan and confirmation that patient is to go to NI lab. Pt will be transferred from CT to NI lab. Family will be informed by Dr. Gr and Dr. Barragan to follow. Pt vitals are still stable, he is awake, neuro signs improving as documented in NIH scale.

## 2024-07-16 NOTE — ED QUICK NOTES
Pt is candidate for TNK, will be given in CT while continued monitoring is happening.  Pharmacist Moshe in CT as well as listed staff. Pt vitals stable, he is awake.

## 2024-07-17 ENCOUNTER — APPOINTMENT (OUTPATIENT)
Dept: CT IMAGING | Facility: HOSPITAL | Age: 84
End: 2024-07-17
Payer: MEDICARE

## 2024-07-17 ENCOUNTER — APPOINTMENT (OUTPATIENT)
Dept: CV DIAGNOSTICS | Facility: HOSPITAL | Age: 84
End: 2024-07-17
Payer: MEDICARE

## 2024-07-17 LAB
ANION GAP SERPL CALC-SCNC: 3 MMOL/L (ref 0–18)
BUN BLD-MCNC: 15 MG/DL (ref 9–23)
CALCIUM BLD-MCNC: 8.3 MG/DL (ref 8.7–10.4)
CHLORIDE SERPL-SCNC: 110 MMOL/L (ref 98–112)
CO2 SERPL-SCNC: 26 MMOL/L (ref 21–32)
CREAT BLD-MCNC: 0.94 MG/DL
EGFRCR SERPLBLD CKD-EPI 2021: 81 ML/MIN/1.73M2 (ref 60–?)
ERYTHROCYTE [DISTWIDTH] IN BLOOD BY AUTOMATED COUNT: 12.9 %
GLUCOSE BLD-MCNC: 100 MG/DL (ref 70–99)
GLUCOSE BLD-MCNC: 130 MG/DL (ref 70–99)
GLUCOSE BLD-MCNC: 151 MG/DL (ref 70–99)
GLUCOSE BLD-MCNC: 156 MG/DL (ref 70–99)
GLUCOSE BLD-MCNC: 84 MG/DL (ref 70–99)
GLUCOSE BLD-MCNC: 97 MG/DL (ref 70–99)
HCT VFR BLD AUTO: 30.9 %
HGB BLD-MCNC: 10.5 G/DL
MAGNESIUM SERPL-MCNC: 1.9 MG/DL (ref 1.6–2.6)
MCH RBC QN AUTO: 30.9 PG (ref 26–34)
MCHC RBC AUTO-ENTMCNC: 34 G/DL (ref 31–37)
MCV RBC AUTO: 90.9 FL
OSMOLALITY SERPL CALC.SUM OF ELEC: 288 MOSM/KG (ref 275–295)
PLATELET # BLD AUTO: 101 10(3)UL (ref 150–450)
POTASSIUM SERPL-SCNC: 4.1 MMOL/L (ref 3.5–5.1)
POTASSIUM SERPL-SCNC: 4.1 MMOL/L (ref 3.5–5.1)
RBC # BLD AUTO: 3.4 X10(6)UL
SODIUM SERPL-SCNC: 139 MMOL/L (ref 136–145)
WBC # BLD AUTO: 4.4 X10(3) UL (ref 4–11)

## 2024-07-17 PROCEDURE — 93306 TTE W/DOPPLER COMPLETE: CPT | Performed by: NURSE PRACTITIONER

## 2024-07-17 PROCEDURE — 99291 CRITICAL CARE FIRST HOUR: CPT | Performed by: INTERNAL MEDICINE

## 2024-07-17 PROCEDURE — 99292 CRITICAL CARE ADDL 30 MIN: CPT | Performed by: INTERNAL MEDICINE

## 2024-07-17 PROCEDURE — 70450 CT HEAD/BRAIN W/O DYE: CPT | Performed by: NURSE PRACTITIONER

## 2024-07-17 PROCEDURE — 99233 SBSQ HOSP IP/OBS HIGH 50: CPT | Performed by: HOSPITALIST

## 2024-07-17 RX ORDER — KETOTIFEN FUMARATE 0.35 MG/ML
1 SOLUTION/ DROPS OPHTHALMIC 2 TIMES DAILY
Status: DISCONTINUED | OUTPATIENT
Start: 2024-07-17 | End: 2024-07-20

## 2024-07-17 RX ORDER — ACETAMINOPHEN 325 MG/1
650 TABLET ORAL EVERY 4 HOURS PRN
Status: DISCONTINUED | OUTPATIENT
Start: 2024-07-17 | End: 2024-07-20

## 2024-07-17 RX ORDER — ONDANSETRON 2 MG/ML
4 INJECTION INTRAMUSCULAR; INTRAVENOUS EVERY 6 HOURS PRN
Status: DISCONTINUED | OUTPATIENT
Start: 2024-07-17 | End: 2024-07-20

## 2024-07-17 RX ORDER — HYDRALAZINE HYDROCHLORIDE 20 MG/ML
10 INJECTION INTRAMUSCULAR; INTRAVENOUS EVERY 2 HOUR PRN
Status: DISCONTINUED | OUTPATIENT
Start: 2024-07-17 | End: 2024-07-20

## 2024-07-17 RX ORDER — ASPIRIN 325 MG
325 TABLET ORAL NIGHTLY
Status: DISCONTINUED | OUTPATIENT
Start: 2024-07-17 | End: 2024-07-20

## 2024-07-17 RX ORDER — ENOXAPARIN SODIUM 100 MG/ML
40 INJECTION SUBCUTANEOUS DAILY
Status: DISCONTINUED | OUTPATIENT
Start: 2024-07-17 | End: 2024-07-17

## 2024-07-17 RX ORDER — LABETALOL HYDROCHLORIDE 5 MG/ML
10 INJECTION, SOLUTION INTRAVENOUS EVERY 10 MIN PRN
Status: DISCONTINUED | OUTPATIENT
Start: 2024-07-17 | End: 2024-07-20

## 2024-07-17 RX ORDER — ACETAMINOPHEN 650 MG/1
650 SUPPOSITORY RECTAL EVERY 4 HOURS PRN
Status: DISCONTINUED | OUTPATIENT
Start: 2024-07-17 | End: 2024-07-20

## 2024-07-17 NOTE — PLAN OF CARE
Stroke booklet given to patient; the following education was provided:     What is stroke  BEFAST - Stroke warning sings and symptoms  How to initiate EMS  Secondary stroke prevention and personalized risk factors: HLD  Lifestyle modifications (nutrition and exercise)  Post-stroke recovery and follow-up  Community resources (stroke support group and non-emergent stroke line)    Patient present during education, patient receptive to teachings. All pertinent questions and concerns were addressed.      RN Stroke Navigator team  [phone]

## 2024-07-17 NOTE — H&P
[facility]     Montrell Roberto Patient Status:  Inpatient    1940 MRN [id number]   Location [facility] 6NE-A Attending Jaun Singh MD   Hosp Day # 0 PCP Enma Clark MD     Chief Complaint: stroke    Subjective:    History of Present Illness:     Montrell Roberto is a 84 year old male with stroke like symptoms that started while [place] [employer] [place] [employer].  She noticed he was having droopy eyes and grasping at things in the air.  She drove home and noticed she couldn't get him out of the car, he was weak on left and was not speaking.  On way to hospital, he was very somnolent.  Now back to nearly baseline.    History/Other:    Past Medical History:  Past Medical History:    Age-related nuclear cataract, bilateral    Cataract    Dry eye    H/O cardiac radiofrequency ablation    @ [facility]    Heart disease    History of permanent cardiac pacemaker placement    Hyperlipidemia    Macular pucker    Posterior vitreous detachment    Ptosis     Past Surgical History:   History reviewed. No pertinent surgical history.   Family History:   Family History   Problem Relation Age of Onset    Cataracts Other        hypertension Social History:    reports that he has never smoked. He has never used smokeless tobacco. He reports that he does not drink alcohol and does not use drugs.     Allergies:   Allergies   Allergen Reactions    Heparin OTHER (SEE COMMENTS)     Family unsure, states it was at [facility]       Medications:    No current facility-administered medications on file prior to encounter.     Current Outpatient Medications on File Prior to Encounter   Medication Sig Dispense Refill    ofloxacin 0.3 % Otic Solution Place 5 drops into the right ear 2 (two) times daily for 5 days. 5 mL 0    gabapentin 300 MG Oral Cap Take 300 mg by mouth 3 (three) times daily.      metoprolol tartrate 10mg/ml Oral Suspension       diazepam (VALIUM) 10 MG  Oral Tab       Hydrocodone-Acetaminophen (VICODIN OR)       Atorvastatin Calcium (LIPITOR OR) Take by mouth.      amiodarone 5mg/ml Oral Suspension          Review of Systems:   A comprehensive 12 point review of systems was completed.    Pertinent positives and negatives noted in the HPI.    Objective:   Physical Exam:    /75 (BP Location: Right arm)   Pulse 60   Temp 97.9 °F (36.6 °C) (Temporal)   Resp 16   Wt 130 lb 4.7 oz (59.1 kg)   SpO2 100%   BMI 22.36 kg/m²   General: No acute distress, Alert  Respiratory: No rhonchi, no wheezes  Cardiovascular: S1, S2. Regular rate and rhythm  Abdomen: Soft, NT/ND, +BS  Neuro: No new focal deficits  Extremities: No edema      Results:    Labs:      Labs Last 24 Hours:    Recent Labs   Lab 07/16/24  1628   RBC 4.35   HGB 13.6   HCT 39.3   MCV 90.3   MCH 31.3   MCHC 34.6   RDW 12.9   NEPRELIM 3.22   WBC 6.1   .0       Recent Labs   Lab 07/16/24  1628   GLU 99   BUN 20   CREATSERUM 1.22   EGFRCR 59*   CA 9.1   ALB 4.3      K 3.7      CO2 26.8   ALKPHO 104   AST 26   ALT 24   BILT 0.5   TP 7.0       Lab Results   Component Value Date    INR 1.05 07/16/2024       Recent Labs   Lab 07/16/24  1628   TROPHS 8.22       No results for input(s): \"TROP\", \"PBNP\" in the last 168 hours.    No results for input(s): \"PCT\" in the last 168 hours.    Imaging: Imaging data reviewed in Epic.    Assessment & Plan:      #acute MCA right M1 occlusion s/p TNK and IR thrombectomy; symptoms resolved;  BP goals and timing aspirin deferred to neuroCC/neuroIR.  Statin; check lipids, A1c.    #dementia  #hx dilated cardiomyopathy with ablation and AICD/ppm-hold BB tonight; re-assess in AM to consider restarting if pulse and BP allow  #cad  #HTN    Addendum: low dose rectal aspirin for dissection per dr. Godoy  SBP goal 100-160    Quality:  DVT prophylaxis:  SCDs;  hold off lovenox until OK'd by neuroCC/neuroIR  Code Status: see chart  Mehta: NO  Mehta Duration (in days):  N/A  Central line: NO  Estimated discharge date: tbd    Plan of care discussed with patient and ER MD Jaun Mejia MD    Supplementary Documentation:             **Certification      PHYSICIAN Certification of Need for Inpatient Hospitalization - Initial Certification    Patient will require inpatient services that will reasonably be expected to span two midnight's based on the clinical documentation in H+P.   Based on patients current state of illness, I anticipate that, after discharge, patient will require TBD.

## 2024-07-17 NOTE — ANESTHESIA POSTPROCEDURE EVALUATION
[facility]    Montrell Roberto Patient Status:  Inpatient   Age/Gender 84 year old male MRN [id number]   Location [facility] 6NE-A Attending Jaun Singh MD   Hosp Day # 0 PCP Enma Clark MD       Anesthesia Post-op Note        Procedure Summary       Date: 07/16/24 Room / Location: [facility]    Anesthesia Start: 1701 Anesthesia Stop: 1851    Procedure: IR INTRA ARTERIAL STROKE INTERVENTION Diagnosis: (stroke)    Scheduled Providers:  Anesthesiologist: Gail Hopper MD    Anesthesia Type: MAC ASA Status: 4 - Emergent            Anesthesia Type: MAC    Vitals Value Taken Time   /58 07/16/24 2300   Temp 97.1 °F (36.2 °C) 07/16/24 2000   Pulse 60 07/16/24 2317   Resp 11 07/16/24 2317   SpO2 100 % 07/16/24 2317   Vitals shown include unfiled device data.    Patient Location: ICU    Anesthesia Type: MAC    Airway Patency: patent    Postop Pain Control: adequate    Mental Status: Other: (Alert and oriented X3, moving all extremities; conversing)    Nausea/Vomiting: none    Cardiopulmonary/Hydration status: stable euvolemic    Complications: no apparent anesthesia related complications    Postop vital signs: stable    Dental Exam: Unchanged from Preop    Sign out given to ICU staff.

## 2024-07-17 NOTE — PROGRESS NOTES
[facility]  Neurosurgery Progress Note    Montrell Roberto Patient Status:  Inpatient    1940 MRN [id number]   Location [facility] 6NE-A Attending Oliver Milan MD   Hosp Day # 1 PCP Enma Clark MD     Chief Complaint:  Acute stroke    Subjective:  Patient awake and alert. Able to lift left leg.     Objective/Physical Exam:    Vital Signs:  Blood pressure 115/71, pulse 60, temperature 98.1 °F (36.7 °C), temperature source Temporal, resp. rate 18, weight 133 lb 13.1 oz (60.7 kg), SpO2 100%.  Respiratory:  nonlabored  CV:  well perfused  General: NAD, Speech Fluent, Mood Appropriate  Neurologic:   A&Ox3  PERRL, EOMi, FS, TM  Full strength x 4, no drift  Skin: Dressings intact and dry, groin site stable    Labs:  Recent Labs   Lab 24  1628 24  0452   RBC 4.35 3.40*   HGB 13.6 10.5*   HCT 39.3 30.9*   MCV 90.3 90.9   MCH 31.3 30.9   MCHC 34.6 34.0   RDW 12.9 12.9   NEPRELIM 3.22  --    WBC 6.1 4.4   .0 101.0*       Recent Labs   Lab 24  1628 24  0452   GLU 99 84   BUN 20 15   CREATSERUM 1.22 0.94   EGFRCR 59* 81   CA 9.1 8.3*    139   K 3.7 4.1  4.1    110   CO2 26.8 26.0       Imaging:  IR INTRA ARTERIAL STROKE INTERVENTION    Result Date: 2024  CEREBRAL ANGIOGRAM PROCEDURE REPORT  PATIENT NAME: Montrell Roberto  DATE OF PROCEDURE: 2024  ATTENDING: Ambrosio Godoy MD  PREOPERATIVE DIAGNOSIS: 1. Right M1 occluison  POSTOPERATIVE DIAGNOSIS: 1. Right M1 occlusion  OPERATION: 1. Diagnostic cerebral angiogram 2. Ultrasound guided arterial access 3. Right middle cerebral artery mechanical thrombectomy  ANESTHESIA: MAC administered by anesthesiology  COMPLICATIONS: none  INDICATIONS: The patient is an 84 year old male who presented with acute onset left sided weakness and was found to have a right M1 occlusion. He was administered TNK. See medical record for further detail.   PROCEDURE: Following explanation of the benefits, risks and  alternatives for the procedure, informed consent was obtained from the patient's daughter who is his power of . The risks including but not limited to stroke, intracranial hemorrhage, vascular injury to the cervical or femoral vessels, groin hematoma, and death were discussed with the patient, his wife, and his daughter. A time-out was performed. Both groins were prepped in the usual sterile fashion using Chloroprep, and sterilely draped. Using ultrasound guidance (permanent image saved), a single wall puncture of the right femoral artery was performed; and a 8-Fr short sheath was inserted into the right common femoral artery. The sheath was then flushed with sterile flushes in the usual fashion.  Using coaxial technique, a Walrus balloon guide catheter with a Winchester-2 select catheter were advanced into the aortic arch, and with the aid of the roadmapping, digital fluoroscopy, and careful guidewire manipulation the right common carotid and right internal carotid arteries were catheterized. Upon each successive selective catheterization, digital subtraction angiography using the appropriate rate and volume of contrast in multiple projections was performed.   FINDINGS:  RIGHT COMMON CAROTID ARTERY (ROADMAP, PA, LATERAL, CERVICAL) The origins of the right internal and external carotid arteries are widely patent without evidence of ulceration or stenosis. The visualized portions of the external carotid artery and its branches are normal without evidence of ulceration or stenosis. There is no evidence of arteriovenous shunting.  RIGHT INTERNAL CAROTID ARTERY (DSA, PA, LATERAL, HEAD) There is a right M1 occlusion.  ENDOVASCULAR INTERVENTION: With the Walrus balloon guide catheter in the distal cervical ICA, the Simmon-2 catheter was removed.  A 6-Fr Eleonora aspiration catheter, a Marksman microcatheter, and Michael 024 microwire were then coaxially advanced through the guide catheter into the right internal  carotid artery.  The microcatheter and microwire were advanced to the point of occlusion, and the aspiration catheter was advanced to the face of the clot.  The microcatheter and microwire were removed and the aspiration catheter was placed on continuous suction.  The catheter was allowed to intercalate with the thrombus, the balloon was inflated, and the aspiration catheter was removed.  The balloon was deflated and a repeat right ICA angiogram demonstrated TICI2b revascularization.   Repeat right ICA angiograms demonstrated two small M3 branch occlusions feeding in the right frontal lobe with retrograde filling of that territory from pial collateral vessels.  The guide catheter was retracted into the common carotid artery and a repeat angiogram of the neck demonstrated a non-flow limiting Biffl grade 1 dissection of the proximal right cervical internal carotid artery.  The Walrus was removed.  The sheath was aspirated and a right femoral artery angiogram was performed which demonstrated puncture above the bifurcation and below the inferior epigastric.  The femoral sheath was then removed and hemostasis was achieved with an 8-Fr Angioseal closure device. The patient tolerated the procedure well and was transported from the angiography suite to the ICU in stable condition.  CONCLUSION Angiographic study demonstrates: 1. Right M1 occlusion with TICI 2b revascularization following one aspiration thrombectomy pass 2. Proximal right cervical internal carotid artery non-flow limiting dissection   Dictated by (CST): Ambrosio Godoy DR on 7/16/2024 at 8:49 PM     Finalized by (CST): Ambrosio Godoy DR on 7/16/2024 at 8:56 PM       CT BRAIN OR HEAD (02751)    Result Date: 7/16/2024  PROCEDURE:  CT BRAIN OR HEAD (33252)  COMPARISON:  KILLIAN COLEMAN, CT STROKE (ALEX) CTA BRAIN/CTA NECK+PERF(CPT=70496/08074/0042T), 7/16/2024, 4:40 PM.  INDICATIONS:  post stroke intervention  TECHNIQUE:  Noncontrast CT scanning is performed  through the brain. Dose reduction techniques were used. Dose information is transmitted to the ACR (American College of Radiology) NRDR (National Radiology Data Registry) which includes the Dose Index Registry.  PATIENT STATED HISTORY: (As transcribed by Technologist)  Patient is status post IR stroke intervention.    FINDINGS: No evidence of intracranial hemorrhage or extra-axial fluid collection. Lucencies in the deep periventricular white matter are likely sequelae of chronic small vessel ischemic disease. Prominence of the lateral and 3rd ventricles is noted.  Mild prominence of the sulci. Contrast from recent examinations is noted.  There appears to be interval opacification of the right M1 segment along with multiple right M2 branches. Visualized portions of paranasal sinuses are unremarkable. Visualized portions of the mastoid air cells are unremarkable. Visualized portions of the orbits are unremarkable. IMPRESSION: 1. No evidence of intracranial hemorrhage or extra-axial fluid collection.  Gray-white differentiation is relatively symmetric.  2. Though this is a noncontrast examination, the patient has contrast on board from recent examinations.  The previously noted occlusion at right M1 segment appears to have been treated.  There is interval opacification of right M1 M2 branches when compared to prior exam.     LOCATION:  [facility]   Dictated by (CST): Live Little MD on 7/16/2024 at 7:23 PM     Finalized by (CST): Live Little MD on 7/16/2024 at 7:25 PM       CT STROKE (ALEX) CTA BRAIN/CTA NECK+PERF(CPT=70496/15285/0042T)    Result Date: 7/16/2024  CONCLUSION:   1. No significant stenosis or aneurysmal dilatation involving the major arterial structures in the neck.  2. Obstruction of the mid right M1 segment with associated marked perfusion abnormality suggesting marked ischemia throughout the entire right MCA territory.  T-max greater than 6 seconds is 148 mL. Mismatch volume is 137 mL.  This  critical result was discussed with Dr. Gr at 1708 hours on 7/16/2024. Read back was performed.    LOCATION:  [facility]   Dictated by (CST): Live Little MD on 7/16/2024 at 5:03 PM     Finalized by (CST): Live Little MD on 7/16/2024 at 5:08 PM       CT STROKE BRAIN (NO IV)(CPT=70450)    Result Date: 7/16/2024  CONCLUSION:  1. No acute intracranial hemorrhage or hydrocephalus. 2. Mild age indeterminate small vessel ischemic disease. If there is clinical concern for acute ischemia/infarction, an MRI of the brain would be recommended for further evaluation.  Critical results were discussed with Dr. Gr at 1643 hours on 7/16/2024. Critical results were read back.   LOCATION:  [facility]   Dictated by (CST): Stromberg, LeRoy, MD on 7/16/2024 at 4:39 PM     Finalized by (CST): Stromberg, LeRoy, MD on 7/16/2024 at 4:44 PM           Assessment:  83 yo male with acute ischemic stroke from right M1 occlusion  AMANDA cervical dissection following thrombectomy    Plan:  S/p mechanical thrombectomy  Medical management/ stroke work up per NCC  Post procedure CT without hemorrhage  Will do 24 hour post TNK CT later today  Aspirin 325mg  DVT prophylaxis SCD    Is this a shared or split note between Advanced Practice Provider and Physician? Yes       BROCK Villegas  [facility]  7/17/2024, 10:42 AM  [facility] [id number]

## 2024-07-17 NOTE — CONSULTS
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    History is obtained from patient. Patient reports he was in his normal state of health this morning and was able to go on a walk and was doing errands with his wife. After errands, he and his wife drove to [facility] for lunch at ~330pm. Patient reports no memory after getting in the car until arriving at the hospital, however per chart review, patient developed droopy eyes and was grasping into the air during the drive. Family took him home but were unable to get him out of the care due to L sided weakness, and he was also noted to be aphasic/nonverbal. EMS was called and he was taken to [place]; he was noted to be somnolent on drive.     In the ED, patient was HDS (T97,9F, HR 70, /65, RR 16, SpO2 97% on room air) and was noted on exam to be completely aphasic with L sided weakness LLE>LUE. Initial NIHSS was 20 for L sided weakness/neglect, L facial, global aphasia, and R gaze preference. A CT brain was obtained showing no acute hemorrhage with mild small vessel ischemic disease. He was given TNK 0.25mg/kg at 1644. Post-TNK NIHSS improved to 14 (gaze-2, face-1, LUE-2, LLE-4, sensory-2, dysarthria-1, extinction-2). A CTA head and neck was obtained showing a mid-right M1 occlusion with marked perfusion abnormality suggesting marked ischemia throughout the entire R MCA. NSGY Dr Godoy was consulted, and patient was taken for thrombectomy, again noted to have a right M1 occlusion, with TICI 2b revascularization after 1 pass, c/b a right cervical ICA origin non-flow limiting  dissection, for which aspirin was ordered.    Patient was brought to the CNICU post-procedure, where he is neuro intact. He reports no complaints and denies HA, CP, SOB, N/V. He reports chronic pain over PPM and is asking for repositioning in bed.      Past Medical History:  Past Medical History:    Age-related nuclear cataract, bilateral    Anxiety    Cataract    Chronic kidney disease (CKD), stage III (moderate) (HCC)    Dementia (HCC)    Dry eye    H/O cardiac radiofrequency ablation    @ [facility]    Heart disease    History of permanent cardiac pacemaker placement    Hyperlipidemia    Macular pucker    Posterior vitreous detachment    Ptosis     Past Surgical History:   Past Surgical History:   Procedure Laterality Date    Cardiac defibrillator placement      Cardiac pacemaker placement      [employer]    Open heart surgical profile  2012      Family History:   Family History   Problem Relation Age of Onset    Cataracts Other      Social History:    reports that he has never smoked. He has never used smokeless tobacco. He reports that he does not drink alcohol and does not use drugs.     Review of Systems:   A comprehensive 10 point review of systems was completed.  Pertinent positives and negatives noted in the HPI.    Current Facility-Administered Medications   Medication Dose Route Frequency    acetaminophen (Tylenol) tab 650 mg  650 mg Oral Q4H PRN    Or    acetaminophen (Tylenol) rectal suppository 650 mg  650 mg Rectal Q4H PRN    melatonin tab 3 mg  3 mg Oral Nightly PRN    polyethylene glycol (PEG 3350) (Miralax) 17 g oral packet 17 g  17 g Oral Daily PRN    sennosides (Senokot) tab 17.2 mg  17.2 mg Oral Nightly PRN    bisacodyl (Dulcolax) 10 MG rectal suppository 10 mg  10 mg Rectal Daily PRN    benzonatate (Tessalon) cap 200 mg  200 mg Oral TID PRN    glycerin-hypromellose- (Artificial Tears) 0.2-0.2-1 % ophthalmic solution 1 drop  1 drop Both Eyes QID PRN    sodium chloride  (Saline Mist) 0.65 % nasal solution 1 spray  1 spray Each Nare Q3H PRN    sodium chloride 0.9% infusion   Intravenous Continuous    labetalol (Trandate) 5 mg/mL injection 10 mg  10 mg Intravenous Q10 Min PRN    hydrALAzine (Apresoline) 20 mg/mL injection 10 mg  10 mg Intravenous Q2H PRN    niCARdipine in sodium chloride 0.86% (carDENE) 20 mg/200mL infusion premix  5-15 mg/hr Intravenous Continuous PRN    methylPREDNISolone sodium succinate (Solu-MEDROL) injection 125 mg  125 mg Intravenous Once PRN    And    diphenhydrAMINE (Benadryl) 50 mg/mL  injection 50 mg  50 mg Intravenous Once PRN    And    famotidine (Pepcid) 20 mg/2mL injection 20 mg  20 mg Intravenous Once PRN    ondansetron (Zofran) 4 MG/2ML injection 4 mg  4 mg Intravenous Q6H PRN    atorvastatin (Lipitor) tab 40 mg  40 mg Oral Nightly    atropine 0.1 MG/ML injection 0.5 mg  0.5 mg Intravenous PRN    naloxone (Narcan) 0.4 MG/ML injection 0.08 mg  0.08 mg Intravenous PRN    diphenhydrAMINE (Benadryl) 50 mg/mL  injection 12.5 mg  12.5 mg Intravenous PRN    aspirin 300 MG rectal suppository 150 mg  150 mg Rectal Daily    amiodarone (Pacerone) tab 200 mg  200 mg Oral Daily    divalproex DR (Depakote) tab 125 mg  125 mg Oral TID    gabapentin (Neurontin) cap 300 mg  300 mg Oral TID    melatonin tab 3 mg  3 mg Oral Nightly    memantine (Namenda) tab 10 mg  10 mg Oral BID    mexiletine (Mexitil) cap 150 mg  150 mg Oral TID    QUEtiapine (SEROquel) tab 25 mg  25 mg Oral Nightly    [Held by provider] metoprolol tartrate (Lopressor) tab 25 mg  25 mg Oral BID    [START ON 7/17/2024] QUEtiapine (SEROquel) partial tab 12.5 mg  12.5 mg Oral Every afternoon at 1400    ALPRAZolam (Xanax) tab 0.25 mg  0.25 mg Oral TID PRN     Facility-Administered Medications Ordered in Other Encounters   Medication Dose Route Frequency    fentaNYL (Sublimaze) 50 mcg/mL injection   Intravenous PRN    phenylephrine (Brendan-Synephrine) 10 MG/ML injection   Intravenous PRN    sodium chloride  0.9% infusion   Intravenous Continuous PRN     OBJECTIVE  Vitals:  /62   Pulse 61   Temp 97.1 °F (36.2 °C) (Temporal)   Resp 14   Wt 130 lb 4.7 oz (59.1 kg)   SpO2 100%   BMI 22.36 kg/m²           Physical Exam:    CONSTITUTIONAL: Awake, alert, cooperative, no apparent distress, appears stated age. Thin, frail, temporal wasting noted  NEUROLOGIC:    A&Ox3, normal attention/memory, language normal   PERRL, EOMI   Face symmetric, shoulder shrug equal, tongue midline   BUE no drift, 5/5 strength with very slight pronation of LUE, follows commands    BLE no drift, 5/5 strength, follows commands    Tone normal, no abnormal movements present   Sensation grossly intact to light touch   LUNGS: CTA b/l, no wheezing or crackles appreciated  CARDIOVASCULAR: RRR, no murmurs noted, peripheral pulses intact  ABDOMEN: thin, normal bowel sounds, non-distended, non-tender  GENITAL/URINARY: voiding  SKIN: normal skin color and turgor. R groin site with dressing in place, C/D/I without bleeding or hematoma, DP/PT pulses 1+ and equal bilaterally   Lines: PIV    Data this admission:  IR INTRA ARTERIAL STROKE INTERVENTION    Result Date: 7/16/2024  CEREBRAL ANGIOGRAM PROCEDURE REPORT  PATIENT NAME: Montrell Roberto  DATE OF PROCEDURE: 7/16/2024  ATTENDING: Ambrosio Godoy MD  PREOPERATIVE DIAGNOSIS: 1. Right M1 occluison  POSTOPERATIVE DIAGNOSIS: 1. Right M1 occlusion  OPERATION: 1. Diagnostic cerebral angiogram 2. Ultrasound guided arterial access 3. Right middle cerebral artery mechanical thrombectomy  ANESTHESIA: MAC administered by anesthesiology  COMPLICATIONS: none  INDICATIONS: The patient is an 84 year old male who presented with acute onset left sided weakness and was found to have a right M1 occlusion. He was administered TNK. See medical record for further detail.   PROCEDURE: Following explanation of the benefits, risks and alternatives for the procedure, informed consent was obtained from the patient's daughter  who is his power of . The risks including but not limited to stroke, intracranial hemorrhage, vascular injury to the cervical or femoral vessels, groin hematoma, and death were discussed with the patient, his wife, and his daughter. A time-out was performed. Both groins were prepped in the usual sterile fashion using Chloroprep, and sterilely draped. Using ultrasound guidance (permanent image saved), a single wall puncture of the right femoral artery was performed; and a 8-Fr short sheath was inserted into the right common femoral artery. The sheath was then flushed with sterile flushes in the usual fashion.  Using coaxial technique, a Walrus balloon guide catheter with a Winchester-2 select catheter were advanced into the aortic arch, and with the aid of the roadmapping, digital fluoroscopy, and careful guidewire manipulation the right common carotid and right internal carotid arteries were catheterized. Upon each successive selective catheterization, digital subtraction angiography using the appropriate rate and volume of contrast in multiple projections was performed.   FINDINGS:  RIGHT COMMON CAROTID ARTERY (ROADMAP, PA, LATERAL, CERVICAL) The origins of the right internal and external carotid arteries are widely patent without evidence of ulceration or stenosis. The visualized portions of the external carotid artery and its branches are normal without evidence of ulceration or stenosis. There is no evidence of arteriovenous shunting.  RIGHT INTERNAL CAROTID ARTERY (DSA, PA, LATERAL, HEAD) There is a right M1 occlusion.  ENDOVASCULAR INTERVENTION: With the Walrus balloon guide catheter in the distal cervical ICA, the Simmon-2 catheter was removed.  A 6-Fr Eleonora aspiration catheter, a Marksman microcatheter, and Michael 024 microwire were then coaxially advanced through the guide catheter into the right internal carotid artery.  The microcatheter and microwire were advanced to the point of occlusion, and  the aspiration catheter was advanced to the face of the clot.  The microcatheter and microwire were removed and the aspiration catheter was placed on continuous suction.  The catheter was allowed to intercalate with the thrombus, the balloon was inflated, and the aspiration catheter was removed.  The balloon was deflated and a repeat right ICA angiogram demonstrated TICI2b revascularization.   Repeat right ICA angiograms demonstrated two small M3 branch occlusions feeding in the right frontal lobe with retrograde filling of that territory from pial collateral vessels.  The guide catheter was retracted into the common carotid artery and a repeat angiogram of the neck demonstrated a non-flow limiting Biffl grade 1 dissection of the proximal right cervical internal carotid artery.  The Walrus was removed.  The sheath was aspirated and a right femoral artery angiogram was performed which demonstrated puncture above the bifurcation and below the inferior epigastric.  The femoral sheath was then removed and hemostasis was achieved with an 8-Fr Angioseal closure device. The patient tolerated the procedure well and was transported from the angiography suite to the ICU in stable condition.  CONCLUSION Angiographic study demonstrates: 1. Right M1 occlusion with TICI 2b revascularization following one aspiration thrombectomy pass 2. Proximal right cervical internal carotid artery non-flow limiting dissection   Dictated by (CST): Ambrosio Godoy DR on 7/16/2024 at 8:49 PM     Finalized by (CST): Ambrosio Godoy DR on 7/16/2024 at 8:56 PM       XR CHEST AP PORTABLE  (CPT=71045)    Result Date: 7/16/2024  PROCEDURE:  XR CHEST AP PORTABLE  (CPT=71045)  TECHNIQUE:  AP chest radiograph was obtained.  COMPARISON:  None.  INDICATIONS:  stroke  PATIENT STATED HISTORY: (As transcribed by Technologist)     FINDINGS: Cardiac silhouette and pulmonary vasculature are unremarkable.  Left-sided pacemaker and leads are noted. No  consolidation, pleural effusion or pneumothorax. IMPRESSION: Lateral portion of the left side of the chest is excluded.  No consolidation.   LOCATION:  [facility]      Dictated by (CST): Live Little MD on 7/16/2024 at 8:48 PM     Finalized by (CST): Live Little MD on 7/16/2024 at 8:48 PM       CT BRAIN OR HEAD (92094)    Result Date: 7/16/2024  PROCEDURE:  CT BRAIN OR HEAD (94387)  COMPARISON:  [facility] , CT, CT STROKE (ALEX) CTA BRAIN/CTA NECK+PERF(CPT=70496/86897/0042T), 7/16/2024, 4:40 PM.  INDICATIONS:  post stroke intervention  TECHNIQUE:  Noncontrast CT scanning is performed through the brain. Dose reduction techniques were used. Dose information is transmitted to the ACR (American College of Radiology) NRDR (National Radiology Data Registry) which includes the Dose Index Registry.  PATIENT STATED HISTORY: (As transcribed by Technologist)  Patient is status post IR stroke intervention.    FINDINGS: No evidence of intracranial hemorrhage or extra-axial fluid collection. Lucencies in the deep periventricular white matter are likely sequelae of chronic small vessel ischemic disease. Prominence of the lateral and 3rd ventricles is noted.  Mild prominence of the sulci. Contrast from recent examinations is noted.  There appears to be interval opacification of the right M1 segment along with multiple right M2 branches. Visualized portions of paranasal sinuses are unremarkable. Visualized portions of the mastoid air cells are unremarkable. Visualized portions of the orbits are unremarkable. IMPRESSION: 1. No evidence of intracranial hemorrhage or extra-axial fluid collection.  Gray-white differentiation is relatively symmetric.  2. Though this is a noncontrast examination, the patient has contrast on board from recent examinations.  The previously noted occlusion at right M1 segment appears to have been treated.  There is interval opacification of right M1 M2 branches when compared to prior exam.     LOCATION:   [facility]   Dictated by (CST): Live Little MD on 7/16/2024 at 7:23 PM     Finalized by (CST): Live Little MD on 7/16/2024 at 7:25 PM       CT STROKE (ALEX) CTA BRAIN/CTA NECK+PERF(CPT=70496/57151/0042T)    Result Date: 7/16/2024  CONCLUSION:   1. No significant stenosis or aneurysmal dilatation involving the major arterial structures in the neck.  2. Obstruction of the mid right M1 segment with associated marked perfusion abnormality suggesting marked ischemia throughout the entire right MCA territory.  T-max greater than 6 seconds is 148 mL. Mismatch volume is 137 mL.  This critical result was discussed with Dr. Gr at 1708 hours on 7/16/2024. Read back was performed.    LOCATION:  [facility]   Dictated by (CST): Live Little MD on 7/16/2024 at 5:03 PM     Finalized by (CST): Live Little MD on 7/16/2024 at 5:08 PM       CT STROKE BRAIN (NO IV)(CPT=70450)    Result Date: 7/16/2024  CONCLUSION:  1. No acute intracranial hemorrhage or hydrocephalus. 2. Mild age indeterminate small vessel ischemic disease. If there is clinical concern for acute ischemia/infarction, an MRI of the brain would be recommended for further evaluation.  Critical results were discussed with Dr. Gr at 1643 hours on 7/16/2024. Critical results were read back.   LOCATION:  [facility]   Dictated by (CST): Stromberg, LeRoy, MD on 7/16/2024 at 4:39 PM     Finalized by (CST): Stromberg, LeRoy, MD on 7/16/2024 at 4:44 PM       Labs:  Lab Results   Component Value Date    WBC 6.1 07/16/2024    HGB 13.6 07/16/2024    HCT 39.3 07/16/2024    .0 07/16/2024    CREATSERUM 1.22 07/16/2024    BUN 20 07/16/2024     07/16/2024    K 3.7 07/16/2024     07/16/2024    CO2 26.8 07/16/2024    GLU 99 07/16/2024    CA 9.1 07/16/2024    ALB 4.3 07/16/2024    ALKPHO 104 07/16/2024    BILT 0.5 07/16/2024    TP 7.0 07/16/2024    AST 26 07/16/2024    ALT 24 07/16/2024    PTT 29.4 07/16/2024    INR 1.05 07/16/2024      Assessment/Plan:  84 year old male with PMHx significant for dementia, 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    Neuro:  Acute R MCA ischemic stroke: presented with acute onset L sided weakness, aphasia, somnolence on 7/16, to ED with initial NIHHS 20, CT brain showing only mild small vessel ischemic disease, given TNK at 1644, CTA H/N showing mid-right M1 occlusion with marked perfusion abnormality suggesting marked ischemia throughout the entire R MCA, to thrombectomy with Dr Godoy with TICI 2b revascularization with 1 pass, c/b a right cervical ICA origin non-flow limiting dissection. NIHSS on arrival to NSICU 0, neuro intact. PSD#0  Reported dementia: on home memantidine 10mg BID, seroquel nightly, melatonin 3mg nightly   - Admit to CNICU with q1 neuros and VS, daily NIH  - Post-procedure CT brain without bleed   - NSGY following, appreciate recs   - SBP goal 100-160; PRN labetalol, hydralazine    - Continue aspirin 150mg for intra-procedure dissection  - TNK precautions x 24hr, repeat CT brain 7/17 11am or sooner if exam change   - RLE straight x 2hr, vascular checks/eval angio site per protocol  - Secondary stroke ppx: cautious ASA during TNK window as above, lipitor 40mg nightly   - Stroke work-up: EKG, TTE, CTA H/N (completed), FLP, A1c, TSH  - Pain control with APAP PRN  - Continue home seroquel nightly, melatonin nightly   - Unclear indication for depakote - will clarify in AM, continue for now  - PT, OT, SLP evals pending   - Modified [place] 0    CV:   Hx arrhythmogenic cardiomyopathy s/p PPM and ablation: per RN, family reported history of vtach. On home amiodarone 200mg daily, lasix 20mg daily, metoprolol 25mg BID, mexiletine 150mg TID  Hx HTN, HLD: meds as above  Hx CAD: meds as above  - SBP goal 100-160 as above  - Continue home amiodarone 200mg daily, mexiletine 150mg  TID; resume metoprolol in AM pending overnight HR with HR 60s on arrival  - Continue ASA and statin as above  - Check EKG, TTE, FLP;  goal LDL<70 and HDL>40  - Continuous telemetry     Resp: tolerating room air, admit CXR with hypoinflation with RUL atelectasis vs pneumonitis   - IS, cough/deep breathe as able    Renal:   Elevated sCr: sCr on admit 1.2, unclear baseline  Chronically elevated PSA: follows with Urology, slow-growing prostate, no further work-up in light of declining functional status   - Daily BMP, replete lytes PRN, monitor I&Os  - mIVF with NS @ 100ml/hr     GI:   Recent weight loss: follows with GI; recommended EGD/colonoscopy and if negative, CT C/A/P  - NPO pending swallow evaluation  - PRN bowel regimen     Heme:   S/p TNK: as above  - Daily CBC, goal Hgb>7, plt>100k  - VTE ppx with SCDs only during TNK window     ID: afebrile, no leukocytosis  - Monitor for infectious signs     Endo:   - Serial glucose checks  - F/u TSH and A1c     F/E/N:  -IV fluids  -Follow lytes and replete prn    ABCDEF Bundle   A- Assess, prevent and manage pain--prn pain medications  B- Spontaneous breathing trials--N/A  C- Sedation--RASS 0 at goal, cont home seroquel  D- Delirium--CAM-ICU negative  E- Early mobility--Bedrest  F- Family engagement--Update as available  G- Central Line/ Mehta-- N/A    Proph:  -No a/c at this time d/t recent TNK  -SCD    Dispo:     Code Status: Not on file  -Neuro ICU monitoring    Plan of care discussed with Neuro-Intensivist On-Call    A total of 60 minutes of critical care time (exclusive of billable procedures) was administered. This involved direct patient intervention, complex decision making, and/or extensive discussions with the patient, family, and clinical staff.    The 21st Century Cures Act makes medical notes like these available to patients in the interest of transparency. Please be advised this is a medical document. Medical documents are intended to carry relevant  information, facts as evident, and the clinical opinion of the practitioner. The medical note is intended as peer to peer communication and may appear blunt or direct. It is written in medical language and may contain abbreviations or verbiage that are unfamiliar.       Jonathan Segura, APR

## 2024-07-17 NOTE — OCCUPATIONAL THERAPY NOTE
Received order for OT evaluation. R MCA infarct, TNK given 7/16 afternoon, on bedrest until around 1630 today.  7/16 s/p  Diagnostic cerebral angiogram and right MCA mechanical thrombectomy. OT will attempt to see the pt tomorrow.

## 2024-07-17 NOTE — SLP NOTE
ADULT SWALLOWING EVALUATION    ASSESSMENT    ASSESSMENT/OVERALL IMPRESSION:  Pt is an 83 y/o male diagnosed with acute MCA R M1 occlusion and IR thrombectomy. Hx dementia, HTN, CAD. [facility]. Pt currently resides at home with his spouse. Pt denied any communication difficulties. Min-mild dysarthria observed, but unsure if this is baseline. Pt denied any current dysphagia symptoms including, but not limited to odynophagia, reflux, recent PNA, and unintentional weight loss. Occasional globus sensation endorsed. Pt consumes a regular diet and thin liquids at baseline.     Pt presented with an intact oropharyngeal swallow function. OME's revealed slight R facial droop. Bolus acceptance was intact w/o evidence of anterior bolus loss. Mastication and AP transit were thorough and efficient w/o evidence of oral residue. Hyolaryngeal excursion was present per palpation. No overt s/s of aspiration were observed.    Recommend pt consume a regular diet and thin liquids. Aspiration precautions recommended (e.g. small bites and sips, slow rate, upright position). Education was provided re: results and recommendations. SLP will follow up to monitor diet tolerance, reinforce aspiration precautions, and administer communication evaluation.              RECOMMENDATIONS   Diet Recommendations - Solids: Regular  Diet Recommendations - Liquids: Thin Liquids                        Compensatory Strategies Recommended: Slow rate;Small bites and sips  Aspiration Precautions: Upright position;Slow rate;Small bites and sips  Medication Administration Recommendations: One pill at a time;Crushed in puree (larger pills crushed per preference)  Treatment Plan/Recommendations: Communication evaluation;Aspiration precautions    HISTORY   MEDICAL HISTORY  Reason for Referral: Stroke protocol    Problem List  Principal Problem:    Acute CVA (cerebrovascular accident) (HCC)      Past Medical History  Past Medical  History:    Age-related nuclear cataract, bilateral    Anxiety    Cataract    Chronic kidney disease (CKD), stage III (moderate) (HCC)    Dementia (HCC)    Dry eye    H/O cardiac radiofrequency ablation    @ [facility]    Heart disease    History of permanent cardiac pacemaker placement    Hyperlipidemia    Macular pucker    Posterior vitreous detachment    Ptosis       Prior Living Situation: Home with spouse  Diet Prior to Admission: NPO;Regular;Thin liquids  Precautions: Aspiration    Patient/Family Goals: n/a    SWALLOWING HISTORY  Current Diet Consistency: NPO  Dysphagia History: see above  Imaging Results:   FINDINGS:   No evidence of intracranial hemorrhage or extra-axial fluid collection.   Lucencies in the deep periventricular white matter are likely sequelae of chronic small vessel ischemic disease.   Prominence of the lateral and 3rd ventricles is noted.  Mild prominence of the sulci.   Contrast from recent examinations is noted.  There appears to be interval opacification of the right M1 segment along with multiple right M2 branches.   Visualized portions of paranasal sinuses are unremarkable.   Visualized portions of the mastoid air cells are unremarkable.   Visualized portions of the orbits are unremarkable.   IMPRESSION:   1. No evidence of intracranial hemorrhage or extra-axial fluid collection.  Gray-white differentiation is relatively symmetric.      2. Though this is a noncontrast examination, the patient has contrast on board from recent examinations.  The previously noted occlusion at right M1 segment appears to have been treated.  There is interval opacification of right M1 M2 branches when   compared to prior exam.             LOCATION:  [facility]         Dictated by (CST): Live Little MD on 7/16/2024 at 7:23 PM       Finalized by (CST): Live Little MD on 7/16/2024 at 7:25 PM       SUBJECTIVE       OBJECTIVE   ORAL MOTOR EXAMINATION  Dentition: Natural  Symmetry: Reduced right  facial  Strength: Within Functional Limits  Tone: Within Functional Limits  Range of Motion: Within Functional Limits  Rate of Motion: Within Functional Limits    Voice Quality: Clear  Respiratory Status: Nasal cannula  Consistencies Trialed: Thin liquids;Soft solid;Puree;Hard solid  Method of Presentation: Self presentation  Patient Positioning: Upright;Midline    Oral Phase of Swallow: Within Functional Limits                      Pharyngeal Phase of Swallow: Within Functional Limits           (Please note: Silent aspiration cannot be evaluated clinically. Videofluoroscopic Swallow Study is required to rule-out silent aspiration.)    Esophageal Phase of Swallow: No complaints consistent with possible esophageal involvement  Comments: none              GOALS  Goal #1 The patient will tolerate regular consistency and thin liquids without overt signs or symptoms of aspiration with 90 % accuracy over 1-2 session(s).  In Progress   Goal #2 The patient/family/caregiver will demonstrate understanding and implementation of aspiration precautions and swallow strategies independently over 1-2 session(s).    In Progress   Goal #3 Pt will complete communication evaluation.   In Progress   FOLLOW UP  Treatment Plan/Recommendations: Communication evaluation;Aspiration precautions  Number of Visits to Meet Established Goals:  (1-2)  Follow Up Needed (Documentation Required): Yes  SLP Follow-up Date: 07/19/24    Thank you for your referral.   If you have any questions, please contact QUIANA Brooks

## 2024-07-17 NOTE — BRIEF OP NOTE
Pre-Operative Diagnosis: Right M1 occlusion     Post-Operative Diagnosis: Same     Procedure Performed: Diagnostic cerebral angiogram and right MCA mechanical thrombectomy    Attending: Walt    Assistant(s):  None     Surgical Findings: Right M1 occlusion with TICI2b revascularization following 1 mechanical thrombectomy pass. Right cervical ICA origin non-flow limiting dissection following thrombectomy     Plan:  Q1' neuro checks  Head CT post op - no hemorrhage  Low dose rectal aspirin for dissection  -160  Right leg straight x 2 hours    Discussed with Dr. Velez ([facility])

## 2024-07-17 NOTE — PROGRESS NOTES
[facility]  Neurocritical Care Progress Note     Montrell Roberto Patient Status:  Inpatient    1940 MRN [id number]   Location [facility] 6NE-A Attending Oliver Milan MD   Hosp Day # 1 PCP Enma Clark MD     Subjective:   Patient is a 84 year old male h/o htn, hl, dm2, cad, arrhythmia and dilated cardiomyopathy s/p radiofrequency ablation and PPM, and dementia p/w acute onset L sided weakness and slurred speech c/f acute ischemic stroke    Hospital course significant for noted by family to have acute onset slurred speech and L sided weakness thus brought to ED where w/u revealed NIHSS 20 and ct/cta with R m1 occlusion, thus given iv tnk and underwent emergent endovascular mechanical thrombectomy with tici 2b revascularization c/b R cervical ica non-flow limiting dissection     No acute events overnight.    Vitals:   Temp:  [97.1 °F (36.2 °C)-98.2 °F (36.8 °C)] 97.4 °F (36.3 °C)  Pulse:  [60-72] 67  Resp:  [10-23] 12  BP: ()/(51-75) 93/53  SpO2:  [96 %-100 %] 100 %  Body mass index is 22.97 kg/m².    Intake/Output:    Intake/Output Summary (Last 24 hours) at 2024 1643  Last data filed at 2024 1300  Gross per 24 hour   Intake 1627 ml   Output 925 ml   Net 702 ml     Current Meds:  Current Facility-Administered Medications   Medication Dose Route Frequency    aspirin tab 325 mg  325 mg Oral Nightly    ketotifen (Zaditor) 0.035 % ophthalmic solution 1 drop  1 drop Both Eyes BID    enoxaparin (Lovenox) 40 MG/0.4ML SUBQ injection 40 mg  40 mg Subcutaneous Daily    acetaminophen (Tylenol) tab 650 mg  650 mg Oral Q4H PRN    Or    acetaminophen (Tylenol) rectal suppository 650 mg  650 mg Rectal Q4H PRN    labetalol (Trandate) 5 mg/mL injection 10 mg  10 mg Intravenous Q10 Min PRN    hydrALAzine (Apresoline) 20 mg/mL injection 10 mg  10 mg Intravenous Q2H PRN    ondansetron (Zofran) 4 MG/2ML injection 4 mg  4 mg Intravenous Q6H PRN    acetaminophen (Tylenol) tab  650 mg  650 mg Oral Q4H PRN    Or    acetaminophen (Tylenol) rectal suppository 650 mg  650 mg Rectal Q4H PRN    melatonin tab 3 mg  3 mg Oral Nightly PRN    polyethylene glycol (PEG 3350) (Miralax) 17 g oral packet 17 g  17 g Oral Daily PRN    sennosides (Senokot) tab 17.2 mg  17.2 mg Oral Nightly PRN    bisacodyl (Dulcolax) 10 MG rectal suppository 10 mg  10 mg Rectal Daily PRN    benzonatate (Tessalon) cap 200 mg  200 mg Oral TID PRN    glycerin-hypromellose- (Artificial Tears) 0.2-0.2-1 % ophthalmic solution 1 drop  1 drop Both Eyes QID PRN    sodium chloride (Saline Mist) 0.65 % nasal solution 1 spray  1 spray Each Nare Q3H PRN    sodium chloride 0.9% infusion   Intravenous Continuous    niCARdipine in sodium chloride 0.86% (carDENE) 20 mg/200mL infusion premix  5-15 mg/hr Intravenous Continuous PRN    ondansetron (Zofran) 4 MG/2ML injection 4 mg  4 mg Intravenous Q6H PRN    atorvastatin (Lipitor) tab 40 mg  40 mg Oral Nightly    amiodarone (Pacerone) tab 200 mg  200 mg Oral Daily    divalproex DR (Depakote) tab 125 mg  125 mg Oral TID    melatonin tab 3 mg  3 mg Oral Nightly    memantine (Namenda) tab 10 mg  10 mg Oral BID    mexiletine (Mexitil) cap 150 mg  150 mg Oral TID    QUEtiapine (SEROquel) tab 25 mg  25 mg Oral Nightly    [Held by provider] metoprolol tartrate (Lopressor) tab 25 mg  25 mg Oral BID    QUEtiapine (SEROquel) partial tab 12.5 mg  12.5 mg Oral Every afternoon at 1400    ALPRAZolam (Xanax) tab 0.25 mg  0.25 mg Oral TID PRN     Physical Examination:   General- No acute distress  CV- RRR  Resp- CTA Bilat  Neuro-  Mental status- awake and alert, regards and follows commands, oriented x3, speech fluent  Cranial nerves- pupils equally round and reactive to light, extraocular muscles intact, face symmetric, visual fields full  Motor- 5/5 throughout  Sens-  Intact to light touch    Diagnostics:   CT BRAIN OR HEAD (47150)    Result Date: 7/17/2024  CONCLUSION:  1. No acute intracranial  hemorrhage. 2. Mild to moderate age indeterminate small vessel ischemic disease. If there is clinical concern for acute ischemia/infarction, an MRI of the brain would be recommended for further evaluation.    LOCATION:  [id number]   Dictated by (CST): Stromberg, LeRoy, MD on 7/17/2024 at 4:16 PM     Finalized by (CST): Stromberg, LeRoy, MD on 7/17/2024 at 4:21 PM       IR INTRA ARTERIAL STROKE INTERVENTION    Result Date: 7/16/2024  CEREBRAL ANGIOGRAM PROCEDURE REPORT  PATIENT NAME: Montrell Roberto  DATE OF PROCEDURE: 7/16/2024  ATTENDING: Ambrosio Godoy MD  PREOPERATIVE DIAGNOSIS: 1. Right M1 occluison  POSTOPERATIVE DIAGNOSIS: 1. Right M1 occlusion  OPERATION: 1. Diagnostic cerebral angiogram 2. Ultrasound guided arterial access 3. Right middle cerebral artery mechanical thrombectomy  ANESTHESIA: MAC administered by anesthesiology  COMPLICATIONS: none  INDICATIONS: The patient is an 84 year old male who presented with acute onset left sided weakness and was found to have a right M1 occlusion. He was administered TNK. See medical record for further detail.   PROCEDURE: Following explanation of the benefits, risks and alternatives for the procedure, informed consent was obtained from the patient's daughter who is his power of . The risks including but not limited to stroke, intracranial hemorrhage, vascular injury to the cervical or femoral vessels, groin hematoma, and death were discussed with the patient, his wife, and his daughter. A time-out was performed. Both groins were prepped in the usual sterile fashion using Chloroprep, and sterilely draped. Using ultrasound guidance (permanent image saved), a single wall puncture of the right femoral artery was performed; and a 8-Fr short sheath was inserted into the right common femoral artery. The sheath was then flushed with sterile flushes in the usual fashion.  Using coaxial technique, a Walrus balloon guide catheter with a Winchester-2 select catheter were  advanced into the aortic arch, and with the aid of the roadmapping, digital fluoroscopy, and careful guidewire manipulation the right common carotid and right internal carotid arteries were catheterized. Upon each successive selective catheterization, digital subtraction angiography using the appropriate rate and volume of contrast in multiple projections was performed.   FINDINGS:  RIGHT COMMON CAROTID ARTERY (ROADMAP, PA, LATERAL, CERVICAL) The origins of the right internal and external carotid arteries are widely patent without evidence of ulceration or stenosis. The visualized portions of the external carotid artery and its branches are normal without evidence of ulceration or stenosis. There is no evidence of arteriovenous shunting.  RIGHT INTERNAL CAROTID ARTERY (DSA, PA, LATERAL, HEAD) There is a right M1 occlusion.  ENDOVASCULAR INTERVENTION: With the Walrus balloon guide catheter in the distal cervical ICA, the Simmon-2 catheter was removed.  A 6-Fr Eleonora aspiration catheter, a Marksman microcatheter, and Michael 024 microwire were then coaxially advanced through the guide catheter into the right internal carotid artery.  The microcatheter and microwire were advanced to the point of occlusion, and the aspiration catheter was advanced to the face of the clot.  The microcatheter and microwire were removed and the aspiration catheter was placed on continuous suction.  The catheter was allowed to intercalate with the thrombus, the balloon was inflated, and the aspiration catheter was removed.  The balloon was deflated and a repeat right ICA angiogram demonstrated TICI2b revascularization.   Repeat right ICA angiograms demonstrated two small M3 branch occlusions feeding in the right frontal lobe with retrograde filling of that territory from pial collateral vessels.  The guide catheter was retracted into the common carotid artery and a repeat angiogram of the neck demonstrated a non-flow limiting Biffl grade 1  dissection of the proximal right cervical internal carotid artery.  The Walrus was removed.  The sheath was aspirated and a right femoral artery angiogram was performed which demonstrated puncture above the bifurcation and below the inferior epigastric.  The femoral sheath was then removed and hemostasis was achieved with an 8-Fr Angioseal closure device. The patient tolerated the procedure well and was transported from the angiography suite to the ICU in stable condition.  CONCLUSION Angiographic study demonstrates: 1. Right M1 occlusion with TICI 2b revascularization following one aspiration thrombectomy pass 2. Proximal right cervical internal carotid artery non-flow limiting dissection   Dictated by (CST): Ambrosio Godoy DR on 7/16/2024 at 8:49 PM     Finalized by (CST): Ambrosio Godoy DR on 7/16/2024 at 8:56 PM       XR CHEST AP PORTABLE  (CPT=71045)    Result Date: 7/16/2024  PROCEDURE:  XR CHEST AP PORTABLE  (CPT=71045)  TECHNIQUE:  AP chest radiograph was obtained.  COMPARISON:  None.  INDICATIONS:  stroke  PATIENT STATED HISTORY: (As transcribed by Technologist)     FINDINGS: Cardiac silhouette and pulmonary vasculature are unremarkable.  Left-sided pacemaker and leads are noted. No consolidation, pleural effusion or pneumothorax. IMPRESSION: Lateral portion of the left side of the chest is excluded.  No consolidation.   LOCATION:  [facility]      Dictated by (CST): Live Little MD on 7/16/2024 at 8:48 PM     Finalized by (CST): Live Little MD on 7/16/2024 at 8:48 PM       CT BRAIN OR HEAD (50496)    Result Date: 7/16/2024  PROCEDURE:  CT BRAIN OR HEAD (29514)  COMPARISON:  [facility] , CT, CT STROKE (ALEX) CTA BRAIN/CTA NECK+PERF(CPT=70496/12916/0042T), 7/16/2024, 4:40 PM.  INDICATIONS:  post stroke intervention  TECHNIQUE:  Noncontrast CT scanning is performed through the brain. Dose reduction techniques were used. Dose information is transmitted to the ACR (American College of Radiology)  NRDR (National Radiology Data Registry) which includes the Dose Index Registry.  PATIENT STATED HISTORY: (As transcribed by Technologist)  Patient is status post IR stroke intervention.    FINDINGS: No evidence of intracranial hemorrhage or extra-axial fluid collection. Lucencies in the deep periventricular white matter are likely sequelae of chronic small vessel ischemic disease. Prominence of the lateral and 3rd ventricles is noted.  Mild prominence of the sulci. Contrast from recent examinations is noted.  There appears to be interval opacification of the right M1 segment along with multiple right M2 branches. Visualized portions of paranasal sinuses are unremarkable. Visualized portions of the mastoid air cells are unremarkable. Visualized portions of the orbits are unremarkable. IMPRESSION: 1. No evidence of intracranial hemorrhage or extra-axial fluid collection.  Gray-white differentiation is relatively symmetric.  2. Though this is a noncontrast examination, the patient has contrast on board from recent examinations.  The previously noted occlusion at right M1 segment appears to have been treated.  There is interval opacification of right M1 M2 branches when compared to prior exam.     LOCATION:  [facility]   Dictated by (CST): Live Little MD on 7/16/2024 at 7:23 PM     Finalized by (CST): Live Little MD on 7/16/2024 at 7:25 PM       CT STROKE (ALEX) CTA BRAIN/CTA NECK+PERF(CPT=70496/24866/0042T)    Result Date: 7/16/2024  CONCLUSION:   1. No significant stenosis or aneurysmal dilatation involving the major arterial structures in the neck.  2. Obstruction of the mid right M1 segment with associated marked perfusion abnormality suggesting marked ischemia throughout the entire right MCA territory.  T-max greater than 6 seconds is 148 mL. Mismatch volume is 137 mL.  This critical result was discussed with Dr. Gr at 1708 hours on 7/16/2024. Read back was performed.    LOCATION:  [facility]   Dictated by  (CST): Live Little MD on 7/16/2024 at 5:03 PM     Finalized by (CST): Live Little MD on 7/16/2024 at 5:08 PM      Lab Review     Recent Labs   Lab 07/16/24  1628 07/17/24  0452    139   K 3.7 4.1  4.1    110   CO2 26.8 26.0   GLU 99 84   BUN 20 15   CREATSERUM 1.22 0.94     Recent Labs   Lab 07/16/24  1628 07/17/24  0452   WBC 6.1 4.4   HGB 13.6 10.5*   .0 101.0*     Assesment/Plan:     Neuro:  L sided weakness and slurred speech- 2/2 R m1 occlusion.  Pt with risk factors of htn, hl, dm2, cad, arrhythmia and dilated cardiomyopathy.  Now s/p iv tnk, cont post tnk protocol with neurochecks/pt/ot/st.   CT/CTA with R m1 occlusion thus take for emergent endovascular mechanical thrombectomy with tici 2b revascularization c/b R cervical ica non-flow limiting dissection, postop and ASA per [facility].  Exam with complete resolution of symptoms, back to baseline.   Rpt ct 24hrs post tnk (or sooner if any clinical decompensation) and complete stroke w/u (labs, tte etc).   H/o dementia- cont home namenda, seroquel and vpa  Cardiac:  Htn/hl/cad/arrhythmia/dilated cardiomyopathy- sbp goal 100-160 per [facility].  Check tte with bubble to eval for source of infarct, cont statin.  Pulmonary:  Stable on RA  Renal:  IVFs  GI:  PO as tolerated  Heme/ID:  Afebrile, no leukocytosis  Endocrine/Rheum:  Monitor glucose, sliding scale insulin prn  DVT Prophylaxis:  SCD’s    Goals of the Day: neurochecks, post tnk protocol   A total of 80 minutes of critical care time (exclusive of billable procedures) was administered to manage and/or prevent neurologic instability. This involved direct patient intervention, complex decision making, and/or extensive discussions with the patient, family, and clinical staff.    Thank you for allowing me to participate in the care of this patient.     Marito Velez MD, [facility]  Medical Director of System Neurosciences  Chief, Section of Neurocritical Care  [facility]  Neuroscience [place]

## 2024-07-17 NOTE — PLAN OF CARE
Assumed patient care at 1930. Neuros intact, see flowsheet. VSS. AV paced. R groin site soft, no hematoma, c/d/I. Pain managed w/ prn meds. Straight cath x1. Patient and family updated with plan of care.

## 2024-07-17 NOTE — PLAN OF CARE
Assumed pt care at 0730. Pt a/o x4 and resting in bed. Baseline dementia but a/o x4. Forgetful at times. Neuros intact. R groin soft, no hematoma. Dressing c/d/I. Palpable pulses. See flowsheet. VSS. AV-paced. ECHO completed this am. Passed speech eval- regular diet/ thin liquids. Straight cath prn. Repeat CT stable. Pt updated with poc. Transfer orders. Needs MRI.    Pt transferring to [facility]. Report given to LEW Yu.

## 2024-07-17 NOTE — PROGRESS NOTES
[facility]   part of [facility]     Hospitalist Progress Note     Montrell Roberto Patient Status:  Inpatient    1940 MRN [id number]   Location [facility] 6NE-A Attending Oliver Milan MD   Hosp Day # 1 PCP Enma Clark MD     Chief Complaint: L sided weakness    Subjective:     Patient no sx's now other than L eye a bit droopy. Feels like some sand in eye    Objective:    Review of Systems:   A comprehensive review of systems was completed; pertinent positive and negatives stated in subjective.    Vital signs:  Temp:  [97.1 °F (36.2 °C)-98.2 °F (36.8 °C)] 98.1 °F (36.7 °C)  Pulse:  [60-72] 60  Resp:  [10-19] 18  BP: ()/(51-75) 115/71  SpO2:  [96 %-100 %] 100 %    Physical Exam:    General: No acute distress  Respiratory: No wheezes, no rhonchi  Cardiovascular: S1, S2, regular rate and rhythm  Abdomen: Soft, Non-tender, non-distended, positive bowel sounds  Neuro: No new focal deficits. Weakness resolved  Extremities: No edema      Diagnostic Data:    Labs:  Recent Labs   Lab 248 24  0452   WBC 6.1 4.4   HGB 13.6 10.5*   MCV 90.3 90.9   .0 101.0*   INR 1.05  --        Recent Labs   Lab 24  1628 24  0452   GLU 99 84   BUN 20 15   CREATSERUM 1.22 0.94   CA 9.1 8.3*   ALB 4.3  --     139   K 3.7 4.1  4.1    110   CO2 26.8 26.0   ALKPHO 104  --    AST 26  --    ALT 24  --    BILT 0.5  --    TP 7.0  --        Estimated Creatinine Clearance: 50.7 mL/min (based on SCr of 0.94 mg/dL).    Recent Labs   Lab 24  1628   TROPHS 8.22       Recent Labs   Lab 24  1628   PTP 13.7   INR 1.05                  Microbiology    No results found for this visit on 24.      Imaging: Reviewed in Epic.    Medications:    atorvastatin  40 mg Oral Nightly    amiodarone  200 mg Oral Daily    divalproex DR  125 mg Oral TID    melatonin  3 mg Oral Nightly    memantine  10 mg Oral BID    mexiletine  150 mg Oral TID    QUEtiapine  25 mg Oral  Nightly    [Held by provider] metoprolol tartrate  25 mg Oral BID    QUEtiapine  12.5 mg Oral Every afternoon at 1400       Assessment & Plan:      #acute MCA right M1 occlusion s/p TNK and IR thrombectomy  -symptoms resolved  -BP goals and timing aspirin deferred to neuroCC/neuroIR  -Statin  -A1c 5.5  -    #dementia    #hx dilated cardiomyopathy with ablation and AICD/ppm  -hold BB  -echo ef 25-30%, hypokinesis. No shunt  -previous echo 2015 w/ EF 45%  -family to try to get records from [facility]. Reportedly had MI in sept. ECHO at that time was around 35%  -consider cards consult pending records  -d/w neuroCC    #eyelid droop -2/2 CVA?  -feels like sand in eye for the past few weeks. Possibly allergy? Can try pataday eye gtts.     #anemia  -dilution    #cad  #HTN        Oliver Milan MD    Supplementary Documentation:     Quality:  DVT Mechanical Prophylaxis:   SCDs,    DVT Pharmacologic Prophylaxis   Medication   None                Code Status: Not on file  Mehta: No urinary catheter in place  Mehta Duration (in days):   Central line:    JOSI:     Discharge is dependent on: course  At this point Mr. Roberto is expected to be discharge to: tbd    The 21st Century Cures Act makes medical notes like these available to patients in the interest of transparency. Please be advised this is a medical document. Medical documents are intended to carry relevant information, facts as evident, and the clinical opinion of the practitioner. The medical note is intended as peer to peer communication and may appear blunt or direct. It is written in medical language and may contain abbreviations or verbiage that are unfamiliar.

## 2024-07-17 NOTE — PHYSICAL THERAPY NOTE
Reviewed pts chart, Pt had a R MCA infarct and was given TNK on 7/16. Pt is bed until 1630 today. PT will re-attempt when appropriate.

## 2024-07-18 PROBLEM — I50.22 CHRONIC SYSTOLIC CONGESTIVE HEART FAILURE (HCC): Status: ACTIVE | Noted: 2024-07-18

## 2024-07-18 LAB
GLUCOSE BLD-MCNC: 135 MG/DL (ref 70–99)
GLUCOSE BLD-MCNC: 142 MG/DL (ref 70–99)
GLUCOSE BLD-MCNC: 179 MG/DL (ref 70–99)
GLUCOSE BLD-MCNC: 93 MG/DL (ref 70–99)

## 2024-07-18 PROCEDURE — 99233 SBSQ HOSP IP/OBS HIGH 50: CPT | Performed by: OTHER

## 2024-07-18 PROCEDURE — 99232 SBSQ HOSP IP/OBS MODERATE 35: CPT | Performed by: HOSPITALIST

## 2024-07-18 NOTE — PROGRESS NOTES
[facility]   part of [facility]     Hospitalist Progress Note     Montrell Roberto Patient Status:  Inpatient    1940 MRN [id number]   Location [facility] 6NE-A Attending Oliver Milan MD   Hosp Day # 2 PCP Enma Clark MD     Chief Complaint: L sided weakness    Subjective:     No complaints    Objective:    Review of Systems:   A comprehensive review of systems was completed; pertinent positive and negatives stated in subjective.    Vital signs:  Temp:  [97.4 °F (36.3 °C)-98.2 °F (36.8 °C)] 98.1 °F (36.7 °C)  Pulse:  [60-67] 61  Resp:  [11-23] 11  BP: ()/(53-65) 104/55  SpO2:  [96 %-100 %] 99 %    Physical Exam:    General: No acute distress  Respiratory: No wheezes, no rhonchi  Cardiovascular: S1, S2, regular rate and rhythm  Abdomen: Soft, Non-tender, non-distended, positive bowel sounds  Neuro: No new focal deficits. Weakness resolved  Extremities: No edema      Diagnostic Data:    Labs:  Recent Labs   Lab 248 24  0452   WBC 6.1 4.4   HGB 13.6 10.5*   MCV 90.3 90.9   .0 101.0*   INR 1.05  --        Recent Labs   Lab 248 24  0452   GLU 99 84   BUN 20 15   CREATSERUM 1.22 0.94   CA 9.1 8.3*   ALB 4.3  --     139   K 3.7 4.1  4.1    110   CO2 26.8 26.0   ALKPHO 104  --    AST 26  --    ALT 24  --    BILT 0.5  --    TP 7.0  --        Estimated Creatinine Clearance: 50.7 mL/min (based on SCr of 0.94 mg/dL).    Recent Labs   Lab 24  1628   TROPHS 8.22       Recent Labs   Lab 24  1628   PTP 13.7   INR 1.05                  Microbiology    No results found for this visit on 24.      Imaging: Reviewed in Epic.    Medications:    aspirin  325 mg Oral Nightly    ketotifen  1 drop Both Eyes BID    atorvastatin  40 mg Oral Nightly    amiodarone  200 mg Oral Daily    divalproex DR  125 mg Oral TID    melatonin  3 mg Oral Nightly    memantine  10 mg Oral BID    mexiletine  150 mg Oral TID    QUEtiapine  25 mg Oral  Nightly    [Held by provider] metoprolol tartrate  25 mg Oral BID    QUEtiapine  12.5 mg Oral Every afternoon at 1400       Assessment & Plan:      #acute MCA right M1 occlusion s/p TNK and IR thrombectomy  -symptoms resolved  -BP goals and timing aspirin deferred to neuroCC/neuroIR  -Statin  -A1c 5.5  -  -MRI if ppm compatible    #dementia    #hx dilated cardiomyopathy with ablation and AICD/ppm  #chronic systolic CHF  -hold BB  -echo ef 25-30%, hypokinesis. No shunt  -previous echo 2015 w/ EF 45%  -family to try to get records from [facility]. Reportedly had MI in sept. ECHO at that time was around 35%  -cards consult  -ppm interrogation    #eyelid droop -2/2 CVA?  -feels like sand in eye for the past few weeks. Possibly allergy? Can try pataday eye gtts.     #anemia  -dilution    #cad  #HTN        Oliver Milan MD    Supplementary Documentation:     Quality:  DVT Mechanical Prophylaxis:   SCDs,    DVT Pharmacologic Prophylaxis   Medication   None    DVT Pharmacologic prophylaxis: Aspirin 325 mg           Code Status: Not on file  Mehta: No urinary catheter in place  Mehta Duration (in days):   Central line:    JOSI:     Discharge is dependent on: course  At this point Mr. Roberto is expected to be discharge to: tbd    The 21st Century Cures Act makes medical notes like these available to patients in the interest of transparency. Please be advised this is a medical document. Medical documents are intended to carry relevant information, facts as evident, and the clinical opinion of the practitioner. The medical note is intended as peer to peer communication and may appear blunt or direct. It is written in medical language and may contain abbreviations or verbiage that are unfamiliar.

## 2024-07-18 NOTE — PLAN OF CARE
Assumed pt care at 1930  A&Ox4, able to make needs known  AV paced on tele  Voiding per urinal  Complained of mild pain on R groin  Neuro checks Q4hrs, unchanged  Waiting for MRI to be done  IVF infusing per order  Call light in reach  Bed in low position

## 2024-07-18 NOTE — DISCHARGE PLANNING
Patient follow-up appointment information:     Visit Type: Stroke follow up  Date of TIA/Stroke: 7/16/2024  Type of Stroke: Ischemic  Patient to follow-up: 4 weeks  OP Neurologist: N/A  New patient to neurology service: yes  Anticipated discharge (if known): TBD  Discharge disposition (if known): Home    Please contact patient's wife at [phone] to schedule TIA/stroke follow-up appointment.     RN Stroke Navigator team  [phone]

## 2024-07-18 NOTE — PHYSICAL THERAPY NOTE
PHYSICAL THERAPY EVALUATION - INPATIENT     Room Number: 7622/7622-A  Evaluation Date: 7/18/2024  Type of Evaluation: Initial  Physician Order: PT Eval and Treat    Presenting Problem: left sided weakness, aphasia, acute MCA R M1 occlusion s/p TNK and IR thrombectomy 7/16  Co-Morbidities : dementia  Reason for Therapy: Mobility Dysfunction and Discharge Planning    PHYSICAL THERAPY ASSESSMENT   Patient is currently functioning near baseline with bed mobility, transfers, and gait.  Prior to admission, patient's baseline is independent-supervision.  Patient is requiring contact guard assist as a result of the following impairments: decreased functional strength, decreased endurance/aerobic capacity, impaired static and dynamic balance, impaired motor planning, decreased muscular endurance, cognitive deficits (dementia), and medical status.  Physical Therapy will continue to follow for duration of hospitalization.    Patient will benefit from continued skilled PT Services at discharge to promote prior level of function and safety with additional support and return home with home health PT.    PLAN  PT Treatment Plan: Bed mobility;Body mechanics;Endurance;Energy conservation;Patient education;Family education;Gait training;Balance training;Transfer training;Strengthening  Rehab Potential : Fair  Frequency (Obs): 3x/week  Number of Visits to Meet Established Goals: 3      CURRENT GOALS    Goal #1 Patient is able to demonstrate supine - sit EOB @ level: independent     Goal #2 Patient is able to demonstrate transfers EOB to/from Chair/Wheelchair at assistance level: supervision     Goal #3 Patient is able to ambulate 150 feet with assist device:  LRAD  at assistance level: supervision     Goal #4    Goal #5    Goal #6    Goal Comments: Goals established on 7/18/2024      PHYSICAL THERAPY MEDICAL/SOCIAL HISTORY  History related to current admission: Patient is a 84 year old male admitted on 7/16/2024 from home for  left sided weakness, aphasia.  Pt diagnosed with acute MCA R M1 occlusion s/p TNK and IR thrombectomy .      HOME SITUATION  Type of Home: House   Home Layout: One level                Lives With: Spouse  Drives: No  Patient Owned Equipment: Rolling walker;Rollator  Patient Regularly Uses: Reading glasses    Prior Level of [place]: Pt typically indep-supervision with ADLs and mobility. Does not report consistent usage of AD.     SUBJECTIVE  \"Well I used to be independent but now I'm dependent\"       OBJECTIVE  Precautions:  (-160)  Fall Risk: High fall risk    WEIGHT BEARING RESTRICTION  Weight Bearing Restriction: None                PAIN ASSESSMENT  Ratin          COGNITION  Overall Cognitive Status:  Impaired    RANGE OF MOTION AND STRENGTH ASSESSMENT  See OT note for UE assessment    Lower extremity ROM is within functional limits      Lower extremity strength is within functional limits       BALANCE  Static Sitting: Fair +  Dynamic Sitting: Fair +  Static Standing: Poor +  Dynamic Standing: Poor +    ADDITIONAL TESTS  Additional Tests: Modified [place]              Modified [place]: 4                  ACTIVITY TOLERANCE  Pulse: 61  Heart Rate Source: Monitor                   O2 WALK  Oxygen Therapy  SPO2% on Room Air at Rest: 97    NEUROLOGICAL FINDINGS  Neurological Findings: Coordination - Heel to Shin;Coordination - Rapid Alternating Movement;Sensation     Coordination - Heel to Shin: Symmetrical  Coordination - Rapid Alternating Movement: Symmetrical  Sensation: intact to light touch BLE         AM-PAC '6-Clicks' INPATIENT SHORT FORM - BASIC MOBILITY  How much difficulty does the patient currently have...  Patient Difficulty: Turning over in bed (including adjusting bedclothes, sheets and blankets)?: None   Patient Difficulty: Sitting down on and standing up from a chair with arms (e.g., wheelchair, bedside commode, etc.): None   Patient Difficulty: Moving from lying on back to sitting  on the side of the bed?: None   How much help from another person does the patient currently need...   Help from Another: Moving to and from a bed to a chair (including a wheelchair)?: A Little   Help from Another: Need to walk in hospital room?: A Little   Help from Another: Climbing 3-5 steps with a railing?: A Little       AM-PAC Score:  Raw Score: 21   Approx Degree of Impairment: 28.97%   Standardized Score (AM-PAC Scale): 50.25   CMS Modifier (G-Code): CJ    FUNCTIONAL ABILITY STATUS  Gait Assessment   Functional Mobility/Gait Assessment  Gait Assistance: Contact guard assist  Distance (ft): 125  Assistive Device: Rolling walker  Pattern:  (mild unsteadiness)    Skilled Therapy Provided     Bed Mobility:  Rolling: NT  Supine to sit: supervision   Sit to supine: NT     Transfer Mobility:  Sit to stand: supervision   Stand to sit: supervision  Gait = CGA    Therapist's Comments: RN cleared for session. Pt agreeable for therapy, received supine. Pt instructed on continued usage of RW for optimal balance and stability during ambulation. Pt overall able to navigate bilateral surroundings. Instructed to call for nursing staff for any needs and OOB mobility.     Exercise/Education Provided:  Bed mobility  Body mechanics  Energy conservation  Functional activity tolerated  Gait training  Posture  Strengthening  Transfer training    Patient End of Session: Up in chair;Needs met;Call light within reach;RN aware of session/findings;All patient questions and concerns addressed;Alarm set;Discussed recommendations with /      Patient Evaluation Complexity Level:  History High - 3 or more personal factors and/or co-morbidities   Examination of body systems Low - addressing 1-2 elements   Clinical Presentation Low - Stable   Clinical Decision Making Low - Stable       PT Session Time: 25 minutes  Gait Training: 10 minutes  Therapeutic Activity: 5 minutes

## 2024-07-18 NOTE — CONSULTS
[facility]  Cardiology Consultation    Montrell Roberto Patient Status:  Inpatient    1940 MRN [id number]   Location [facility] 7NE-A Attending Oliver Milan MD   Hosp Day # 2 PCP Enma Clark MD     Reason for Consultation:  CVA, dilated cardiomyopathy, pacemaker/ICD    History of Present Illness:  Montrell Roberto is a a(n) 84 year old male who presents 2 days ago for left sided weakness, confusion and aphasia.  He has followed by cardiologist out of [place] and recently moved to the area in the past couple months.  He had been undergoing rehab after a fall earlier in the year.  He does have underlying mild dementia on medications but AOx3 but has short term memory deficits.  He was found to have right MCA ischemic stroke and underwent TNK and mechanical thrombectomy and does have post procedure AMANDA cervical dissection that is followed by neuro services.   He does report pain around pacemaker site but has been present since implant many years ago.  He denies chest pain, dyspnea, orthopnea, PND or LE swelling.  He denies palpitations or syncope.  He denies fever, chills, nausea/vomiting, diarrhea or urinary complaints.      History:  Past Medical History:    Age-related nuclear cataract, bilateral    Anxiety    Cataract    Chronic kidney disease (CKD), stage III (moderate) (HCC)    Dementia (HCC)    Dry eye    H/O cardiac radiofrequency ablation    @ [facility]    Heart disease    History of permanent cardiac pacemaker placement    Hyperlipidemia    Macular pucker    Posterior vitreous detachment    Ptosis     Past Surgical History:   Procedure Laterality Date    Cardiac defibrillator placement      Cardiac pacemaker placement      [place] [employer]    Open heart surgical profile       Family History   Problem Relation Age of Onset    Cataracts Other       reports that he has never smoked. He has never used smokeless tobacco. He reports that he does not drink  alcohol and does not use drugs.    Allergies:  Allergies   Allergen Reactions    Heparin OTHER (SEE COMMENTS)     Family unsure, states it was at [facility]       Medications:    Current Facility-Administered Medications:     mupirocin (Bactroban) 2% nasal ointment 1 Application, 1 Application, Nasal, BID    aspirin tab 325 mg, 325 mg, Oral, Nightly    ketotifen (Zaditor) 0.035 % ophthalmic solution 1 drop, 1 drop, Both Eyes, BID    acetaminophen (Tylenol) tab 650 mg, 650 mg, Oral, Q4H PRN **OR** acetaminophen (Tylenol) rectal suppository 650 mg, 650 mg, Rectal, Q4H PRN    labetalol (Trandate) 5 mg/mL injection 10 mg, 10 mg, Intravenous, Q10 Min PRN    hydrALAzine (Apresoline) 20 mg/mL injection 10 mg, 10 mg, Intravenous, Q2H PRN    ondansetron (Zofran) 4 MG/2ML injection 4 mg, 4 mg, Intravenous, Q6H PRN    acetaminophen (Tylenol) tab 650 mg, 650 mg, Oral, Q4H PRN **OR** acetaminophen (Tylenol) rectal suppository 650 mg, 650 mg, Rectal, Q4H PRN    melatonin tab 3 mg, 3 mg, Oral, Nightly PRN    polyethylene glycol (PEG 3350) (Miralax) 17 g oral packet 17 g, 17 g, Oral, Daily PRN    sennosides (Senokot) tab 17.2 mg, 17.2 mg, Oral, Nightly PRN    bisacodyl (Dulcolax) 10 MG rectal suppository 10 mg, 10 mg, Rectal, Daily PRN    benzonatate (Tessalon) cap 200 mg, 200 mg, Oral, TID PRN    glycerin-hypromellose- (Artificial Tears) 0.2-0.2-1 % ophthalmic solution 1 drop, 1 drop, Both Eyes, QID PRN    sodium chloride (Saline Mist) 0.65 % nasal solution 1 spray, 1 spray, Each Nare, Q3H PRN    sodium chloride 0.9% infusion, , Intravenous, Continuous    niCARdipine in sodium chloride 0.86% (carDENE) 20 mg/200mL infusion premix, 5-15 mg/hr, Intravenous, Continuous PRN    ondansetron (Zofran) 4 MG/2ML injection 4 mg, 4 mg, Intravenous, Q6H PRN    atorvastatin (Lipitor) tab 40 mg, 40 mg, Oral, Nightly    amiodarone (Pacerone) tab 200 mg, 200 mg, Oral, Daily    divalproex DR (Depakote) tab 125 mg, 125 mg, Oral,  TID    melatonin tab 3 mg, 3 mg, Oral, Nightly    memantine (Namenda) tab 10 mg, 10 mg, Oral, BID    mexiletine (Mexitil) cap 150 mg, 150 mg, Oral, TID    QUEtiapine (SEROquel) tab 25 mg, 25 mg, Oral, Nightly    [Held by provider] metoprolol tartrate (Lopressor) tab 25 mg, 25 mg, Oral, BID    QUEtiapine (SEROquel) partial tab 12.5 mg, 12.5 mg, Oral, Every afternoon at 1400    ALPRAZolam (Xanax) tab 0.25 mg, 0.25 mg, Oral, TID PRN    Review of Systems:  A comprehensive review of systems was negative if not otherwise mention in above HPI.    /54 (BP Location: Left arm)   Pulse 60   Temp 98 °F (36.7 °C) (Oral)   Resp 15   Wt 133 lb 13.1 oz (60.7 kg)   SpO2 99%   BMI 22.97 kg/m²   Temp (24hrs), Av.9 °F (36.6 °C), Min:97.5 °F (36.4 °C), Max:98.1 °F (36.7 °C)       Intake/Output Summary (Last 24 hours) at 2024 1701  Last data filed at 2024 1200  Gross per 24 hour   Intake 120 ml   Output 580 ml   Net -460 ml     Wt Readings from Last 3 Encounters:   24 133 lb 13.1 oz (60.7 kg)   24 129 lb (58.5 kg)       Physical Exam:   General: Alert and oriented x 3. No apparent distress. No respiratory or constitutional distress.  HEENT: Normocephalic, anicteric sclera, neck supple.  Neck: No JVD, carotids 2+, no bruits.  Cardiac: Regular rate and rhythm. S1, S2 normal. No murmur, pericardial rub, S3.  Lungs: Clear without wheezes, rales, rhonchi or dullness.  Normal excursions and effort.  Abdomen: Soft, non-tender.   Extremities: Without clubbing, cyanosis or edema.  Peripheral pulses are 2+.  Neurologic: Limited examination, defer to neuro services.  Alert and oriented x3, normal affect but does have short term memory deficit.  Skin: Warm and dry.     Laboratory Data:  Lab Results   Component Value Date    PGLU 142 2024     Recent Labs   Lab 24  1628   TROPHS 8.22         Imaging:  Echo 2024: Conclusions:   1. Left ventricle: The cavity size was mildly increased. Wall thickness  was normal. Systolic function was reduced. The estimated ejection fraction was 25-30%, by visual assessment. Probable hypokinesis of the anteroseptal and anterior walls. Left ventricular diastolic function parameters were normal for the patient's age.   2. Right ventricle: The cavity size was mildly to moderately increased.  Pacer C/W ICD noted in the right ventricle. Systolic function was mildly reduced.   3. Left atrium: The atrium was moderately to markedly dilated.   4. Right atrium: The atrium was moderately dilated. Pacer C/W ICD noted in right atrium.   5. Aortic valve: There was moderate regurgitation directed centrally in the LVOT.   6. Mitral valve: There was moderate regurgitation, directed centrally.   7. Pulmonary arteries: Systolic pressure was within the normal range, estimated to be 25mm Hg.   8. Atrial septum: Agitated saline contrast study showed no atrial level shunt, at baseline or with provocation.   Impressions:  No previous study was available for comparison.     Impression:  Principal Problem:    Acute CVA (cerebrovascular accident) (HCC)  Active Problems:    Chronic systolic congestive heart failure (HCC)  Pacemaker/ICD in place  Prior cardiac ablation procedures  CAD  HTN  HL    Recommendations:  -tele monitoring  -need to request any prior cardiologist most recent progress note, echocardiogram, stress test and cardiac angiogram reports (per wife LVEF was ~30% on most recent echocardiogram)  -does not appear to be in decompensated heart failure  -need to interrogate pacemaker/ICD (per wife it is [employer])  -no concerning symptom of ACS  -anti-platelet and BP recommendations per neuro services  -on statin  -on amiodarone but need clarification of prior arrhythmia history    D/w patient and wife at beside.    Thank you for allowing me to participate in the care of your patient.    Ronald Watkins MD  7/18/2024  5:01 PM

## 2024-07-18 NOTE — PROGRESS NOTES
[facility]  [facility] Neurology Progress Note    Montrell Roberto Patient Status:  Inpatient    1940 MRN [id number]   Location [facility] 7NE-A Attending Oliver Milan MD   Hosp Day # 2 PCP Enma Clark MD     CC: Acute stroke    Subjective:  Montrell Roberto is an 84 year old male with medical history significant for htn, hl, dm2, cad, arrhythmia and dilated cardiomyopathy s/p radiofrequency ablation and PPM, and dementia who presented to the ED with acute onset L sided weakness and slurred speech concerning for acute ischemic stroke .   Hospital course significant for noted by family to have acute onset slurred speech and L sided weakness thus brought to ED where w/u revealed NIHSS 20 and ct/cta with R m1 occlusion, thus given iv tnk and underwent emergent endovascular mechanical thrombectomy with tici 2b revascularization, complicated by R cervical ica non-flow limiting dissection. Repeat CT head 24 hours post TNK  was stable. Was monitored closely in CNICU, transferred out yesterday.     Seen for a follow up visit today. No acute events overnight. Awake, alert and oriented x 3-4. Regards and follows commands. Speech is fluent. Denies any headache, no new focal weakness or numbness/tingling. No dizziness or light headedness. Denies double or blurry vision.  Wife is at bedside.            MEDICATIONS:  No current outpatient medications on file.     Current Facility-Administered Medications   Medication Dose Route Frequency    aspirin tab 325 mg  325 mg Oral Nightly    ketotifen (Zaditor) 0.035 % ophthalmic solution 1 drop  1 drop Both Eyes BID    acetaminophen (Tylenol) tab 650 mg  650 mg Oral Q4H PRN    Or    acetaminophen (Tylenol) rectal suppository 650 mg  650 mg Rectal Q4H PRN    labetalol (Trandate) 5 mg/mL injection 10 mg  10 mg Intravenous Q10 Min PRN    hydrALAzine (Apresoline) 20 mg/mL injection 10 mg  10 mg Intravenous Q2H PRN    ondansetron (Zofran) 4 MG/2ML injection 4  mg  4 mg Intravenous Q6H PRN    acetaminophen (Tylenol) tab 650 mg  650 mg Oral Q4H PRN    Or    acetaminophen (Tylenol) rectal suppository 650 mg  650 mg Rectal Q4H PRN    melatonin tab 3 mg  3 mg Oral Nightly PRN    polyethylene glycol (PEG 3350) (Miralax) 17 g oral packet 17 g  17 g Oral Daily PRN    sennosides (Senokot) tab 17.2 mg  17.2 mg Oral Nightly PRN    bisacodyl (Dulcolax) 10 MG rectal suppository 10 mg  10 mg Rectal Daily PRN    benzonatate (Tessalon) cap 200 mg  200 mg Oral TID PRN    glycerin-hypromellose- (Artificial Tears) 0.2-0.2-1 % ophthalmic solution 1 drop  1 drop Both Eyes QID PRN    sodium chloride (Saline Mist) 0.65 % nasal solution 1 spray  1 spray Each Nare Q3H PRN    sodium chloride 0.9% infusion   Intravenous Continuous    niCARdipine in sodium chloride 0.86% (carDENE) 20 mg/200mL infusion premix  5-15 mg/hr Intravenous Continuous PRN    ondansetron (Zofran) 4 MG/2ML injection 4 mg  4 mg Intravenous Q6H PRN    atorvastatin (Lipitor) tab 40 mg  40 mg Oral Nightly    amiodarone (Pacerone) tab 200 mg  200 mg Oral Daily    divalproex DR (Depakote) tab 125 mg  125 mg Oral TID    melatonin tab 3 mg  3 mg Oral Nightly    memantine (Namenda) tab 10 mg  10 mg Oral BID    mexiletine (Mexitil) cap 150 mg  150 mg Oral TID    QUEtiapine (SEROquel) tab 25 mg  25 mg Oral Nightly    [Held by provider] metoprolol tartrate (Lopressor) tab 25 mg  25 mg Oral BID    QUEtiapine (SEROquel) partial tab 12.5 mg  12.5 mg Oral Every afternoon at 1400    ALPRAZolam (Xanax) tab 0.25 mg  0.25 mg Oral TID PRN       REVIEW OF SYSTEMS:  A 10-point system was reviewed.  Pertinent positives and negatives are noted in HPI.      PHYSICAL EXAMINATION:  VITAL SIGNS: /55 (BP Location: Left arm)   Pulse 61   Temp 98.1 °F (36.7 °C) (Oral)   Resp 11   Wt 133 lb 13.1 oz (60.7 kg)   SpO2 99%   BMI 22.97 kg/m²   GENERAL:  Patient is a 84 year old male in no acute distress.  HEENT:  Normocephalic, atraumatic  ABD:  Soft, non tender  SKIN: Warm, dry, no rashes    NEUROLOGICAL:   Mental status: Oriented to person, place, situation and time   Speech: Fluent, no obvious aphasia or dysarthria  Memory and comprehension: Intact   Cranial Nerves: VFF, PERRL 3mm brisk, EOMI, no nystagmus, facial sensation intact, face symmetric, tongue midline, shoulder shrug equal, remainder CN grossly intact  Motor: No drift, no focal arm or leg weakness. Motor strength is 5 out of 5 in all extremities.  Sensory: Intact to light touch  Coordination: FTN intact  Gait: Deferred      Imaging/Diagnostics:  CT BRAIN OR HEAD (63246)    Result Date: 7/17/2024  CONCLUSION:  1. No acute intracranial hemorrhage. 2. Mild to moderate age indeterminate small vessel ischemic disease. If there is clinical concern for acute ischemia/infarction, an MRI of the brain would be recommended for further evaluation.    LOCATION:  [facility]   Dictated by (CST): Stromberg, LeRoy, MD on 7/17/2024 at 4:16 PM     Finalized by (CST): Stromberg, LeRoy, MD on 7/17/2024 at 4:21 PM       IR INTRA ARTERIAL STROKE INTERVENTION    Result Date: 7/16/2024  CEREBRAL ANGIOGRAM PROCEDURE REPORT  PATIENT NAME: Montrell Roberto  DATE OF PROCEDURE: 7/16/2024  ATTENDING: Ambrosio Godoy MD  PREOPERATIVE DIAGNOSIS: 1. Right M1 occluison  POSTOPERATIVE DIAGNOSIS: 1. Right M1 occlusion  OPERATION: 1. Diagnostic cerebral angiogram 2. Ultrasound guided arterial access 3. Right middle cerebral artery mechanical thrombectomy  ANESTHESIA: MAC administered by anesthesiology  COMPLICATIONS: none  INDICATIONS: The patient is an 84 year old male who presented with acute onset left sided weakness and was found to have a right M1 occlusion. He was administered TNK. See medical record for further detail.   PROCEDURE: Following explanation of the benefits, risks and alternatives for the procedure, informed consent was obtained from the patient's daughter who is his power of . The risks including but not  limited to stroke, intracranial hemorrhage, vascular injury to the cervical or femoral vessels, groin hematoma, and death were discussed with the patient, his wife, and his daughter. A time-out was performed. Both groins were prepped in the usual sterile fashion using Chloroprep, and sterilely draped. Using ultrasound guidance (permanent image saved), a single wall puncture of the right femoral artery was performed; and a 8-Fr short sheath was inserted into the right common femoral artery. The sheath was then flushed with sterile flushes in the usual fashion.  Using coaxial technique, a Walrus balloon guide catheter with a Winchester-2 select catheter were advanced into the aortic arch, and with the aid of the roadmapping, digital fluoroscopy, and careful guidewire manipulation the right common carotid and right internal carotid arteries were catheterized. Upon each successive selective catheterization, digital subtraction angiography using the appropriate rate and volume of contrast in multiple projections was performed.   FINDINGS:  RIGHT COMMON CAROTID ARTERY (ROADMAP, PA, LATERAL, CERVICAL) The origins of the right internal and external carotid arteries are widely patent without evidence of ulceration or stenosis. The visualized portions of the external carotid artery and its branches are normal without evidence of ulceration or stenosis. There is no evidence of arteriovenous shunting.  RIGHT INTERNAL CAROTID ARTERY (DSA, PA, LATERAL, HEAD) There is a right M1 occlusion.  ENDOVASCULAR INTERVENTION: With the Walrus balloon guide catheter in the distal cervical ICA, the Simmon-2 catheter was removed.  A 6-Fr Eleonora aspiration catheter, a Marksman microcatheter, and Michael 024 microwire were then coaxially advanced through the guide catheter into the right internal carotid artery.  The microcatheter and microwire were advanced to the point of occlusion, and the aspiration catheter was advanced to the face of the  clot.  The microcatheter and microwire were removed and the aspiration catheter was placed on continuous suction.  The catheter was allowed to intercalate with the thrombus, the balloon was inflated, and the aspiration catheter was removed.  The balloon was deflated and a repeat right ICA angiogram demonstrated TICI2b revascularization.   Repeat right ICA angiograms demonstrated two small M3 branch occlusions feeding in the right frontal lobe with retrograde filling of that territory from pial collateral vessels.  The guide catheter was retracted into the common carotid artery and a repeat angiogram of the neck demonstrated a non-flow limiting Biffl grade 1 dissection of the proximal right cervical internal carotid artery.  The Walrus was removed.  The sheath was aspirated and a right femoral artery angiogram was performed which demonstrated puncture above the bifurcation and below the inferior epigastric.  The femoral sheath was then removed and hemostasis was achieved with an 8-Fr Angioseal closure device. The patient tolerated the procedure well and was transported from the angiography suite to the ICU in stable condition.  CONCLUSION Angiographic study demonstrates: 1. Right M1 occlusion with TICI 2b revascularization following one aspiration thrombectomy pass 2. Proximal right cervical internal carotid artery non-flow limiting dissection   Dictated by (CST): Ambrosio Godoy DR on 7/16/2024 at 8:49 PM     Finalized by (CST): Ambrosio Godoy DR on 7/16/2024 at 8:56 PM       XR CHEST AP PORTABLE  (CPT=71045)    Result Date: 7/16/2024  PROCEDURE:  XR CHEST AP PORTABLE  (CPT=71045)  TECHNIQUE:  AP chest radiograph was obtained.  COMPARISON:  None.  INDICATIONS:  stroke  PATIENT STATED HISTORY: (As transcribed by Technologist)     FINDINGS: Cardiac silhouette and pulmonary vasculature are unremarkable.  Left-sided pacemaker and leads are noted. No consolidation, pleural effusion or pneumothorax. IMPRESSION:  Lateral portion of the left side of the chest is excluded.  No consolidation.   LOCATION:  [facility]      Dictated by (CST): Live Little MD on 7/16/2024 at 8:48 PM     Finalized by (CST): Live Little MD on 7/16/2024 at 8:48 PM       CT BRAIN OR HEAD ([id number])    Result Date: 7/16/2024  PROCEDURE:  CT BRAIN OR HEAD (81616)  COMPARISON:  [facility] , CT, CT STROKE (ALEX) CTA BRAIN/CTA NECK+PERF(CPT=70496/29445/0042T), 7/16/2024, 4:40 PM.  INDICATIONS:  post stroke intervention  TECHNIQUE:  Noncontrast CT scanning is performed through the brain. Dose reduction techniques were used. Dose information is transmitted to the ACR (American College of Radiology) NRDR (National Radiology Data Registry) which includes the Dose Index Registry.  PATIENT STATED HISTORY: (As transcribed by Technologist)  Patient is status post IR stroke intervention.    FINDINGS: No evidence of intracranial hemorrhage or extra-axial fluid collection. Lucencies in the deep periventricular white matter are likely sequelae of chronic small vessel ischemic disease. Prominence of the lateral and 3rd ventricles is noted.  Mild prominence of the sulci. Contrast from recent examinations is noted.  There appears to be interval opacification of the right M1 segment along with multiple right M2 branches. Visualized portions of paranasal sinuses are unremarkable. Visualized portions of the mastoid air cells are unremarkable. Visualized portions of the orbits are unremarkable. IMPRESSION: 1. No evidence of intracranial hemorrhage or extra-axial fluid collection.  Gray-white differentiation is relatively symmetric.  2. Though this is a noncontrast examination, the patient has contrast on board from recent examinations.  The previously noted occlusion at right M1 segment appears to have been treated.  There is interval opacification of right M1 M2 branches when compared to prior exam.     LOCATION:  [facility]   Dictated by (CST): Live Little MD on  7/16/2024 at 7:23 PM     Finalized by (CST): Live Little MD on 7/16/2024 at 7:25 PM       CT STROKE (ALEX) CTA BRAIN/CTA NECK+PERF(CPT=70496/71331/0042T)    Result Date: 7/16/2024  CONCLUSION:   1. No significant stenosis or aneurysmal dilatation involving the major arterial structures in the neck.  2. Obstruction of the mid right M1 segment with associated marked perfusion abnormality suggesting marked ischemia throughout the entire right MCA territory.  T-max greater than 6 seconds is 148 mL. Mismatch volume is 137 mL.  This critical result was discussed with Dr. Gr at 1708 hours on 7/16/2024. Read back was performed.    LOCATION:  [facility]   Dictated by (CST): Live Little MD on 7/16/2024 at 5:03 PM     Finalized by (CST): Live Little MD on 7/16/2024 at 5:08 PM       CT STROKE BRAIN (NO IV)(CPT=70450)    Result Date: 7/16/2024  CONCLUSION:  1. No acute intracranial hemorrhage or hydrocephalus. 2. Mild age indeterminate small vessel ischemic disease. If there is clinical concern for acute ischemia/infarction, an MRI of the brain would be recommended for further evaluation.  Critical results were discussed with Dr. Gr at 1643 hours on 7/16/2024. Critical results were read back.   LOCATION:  [facility]   Dictated by (CST): Stromberg, LeRoy, MD on 7/16/2024 at 4:39 PM     Finalized by (CST): Stromberg, LeRoy, MD on 7/16/2024 at 4:44 PM          Labs:  Recent Labs   Lab 07/16/24 1628 07/17/24  0452   RBC 4.35 3.40*   HGB 13.6 10.5*   HCT 39.3 30.9*   MCV 90.3 90.9   MCH 31.3 30.9   MCHC 34.6 34.0   RDW 12.9 12.9   NEPRELIM 3.22  --    WBC 6.1 4.4   .0 101.0*         Recent Labs   Lab 07/16/24  1628 07/17/24  0452   GLU 99 84   BUN 20 15   CREATSERUM 1.22 0.94   EGFRCR 59* 81   CA 9.1 8.3*    139   K 3.7 4.1  4.1    110   CO2 26.8 26.0       Assessment & Plan:    An 84 year old male with:    Left sided weakness and slurred speech, secondary to R M1 occlusion as seen on CTA,  concerning for acute stroke in a setting of risk factors of htn, hl, dm2, cad, arrhythmia and dilated cardiomyopathy. Back to baseline, complete resolution of symptoms.   CT/CTA with R m1 occlusion   S/p TNK and emergency endovascular mechanical thrombectomy with tici 2b revascularization complicated by right cervical ICA non-flow limiting dissection.  Repeat CT head 24 hours post TNK - stable  MRI brain - pending  Echo w bubbles - no shunt, no thrombus. EF 25-30%  Stroke labs: A1c 5.5, lipid panel with . Was not on any blood thinners, antiplatelets or anti lipids at home. Started on atorvastatin 40 mg daily, ASA started yesterday at 325 mg daily. Continue for stroke prophylaxis.   PT/OT/ST/ rehab   Hx of dementia - continue namenda, seroquel and VPA.  Hx of HTN, HLD, CAD, arrhythmia/dilated cardiomyopathy - may resume home meds, SBP goal per ROSEY 100-160  Discussed with patient and family. Discussed with dr. Rogers. To follow up with further recommendations if indicated.   Is this a shared or split note between Advanced Practice Provider and Physician? Yes       Brooke [facility]  7/18/2024, 10:35 AM   [facility] # [id number]      Impression/plan/MDM:  Patient seen and examined personally.  Investigations reviewed.      Spoke with left-sided weakness and slurring of speech, status post IV TNK and endovascular mechanical thrombectomy with tici 2b revascularization, c/b right ICA and nonflow limiting dissection.  Currently stable.  Awaiting to see if patient can have an MRI since he has a defibrillator/pacemaker.  Echocardiogram reported ejection fraction of 25 to 30%.  No evidence of significant stenosis or thrombi noted.  , A1c 5.5.  Patient presently on aspirin 325 mg daily and atorvastatin 40 mg daily.  Advised OT/PT/rehab.  Dementia.  Continue Namenda, Seroquel and valproic acid as previously.  Post TNK CT scan unremarkable.    Patient and family member counseled.  Will  continue to follow.      Overall time spent 35 minutes.  Greater than 50% time spent counseling.

## 2024-07-18 NOTE — OCCUPATIONAL THERAPY NOTE
OCCUPATIONAL THERAPY EVALUATION - INPATIENT     Room Number: 7622/7622-A  Evaluation Date: 7/18/2024  Type of Evaluation: Initial  Presenting Problem: Acute R MCA CVA    Physician Order: IP Consult to Occupational Therapy  Reason for Therapy: ADL/IADL Dysfunction and Discharge Planning    OCCUPATIONAL THERAPY ASSESSMENT   Patient is currently functioning below baseline with toileting, bathing, lower body dressing, bed mobility, transfers, and dynamic standing balance. Prior to admission, patient's baseline is independent.  Patient is requiring minimal assist as a result of the following impairments: decreased functional strength, decreased functional reach, decreased endurance, impaired dynamic standing balance, cognitive deficits (divided attention, executive function), and decreased visual spatial awareness. Occupational Therapy will continue to follow for duration of hospitalization.    Patient will benefit from continued skilled OT Services at discharge to promote prior level of function and safety with additional support and return home with home health OT      History Related to Current Admission: Patient is a 84 year old male admitted on 7/16/2024 with Presenting Problem: Acute R MCA CVA.s/p TNK; s/p DCA, R middle cerebral artery mechanical thrombectomy    Co-Morbidities : dementia, anxiety, ptosis, CKDIII    Imaging:   IR INTRA ARTERIAL STROKE INTERVENTION  Result Date: 7/16/2024  CEREBRAL ANGIOGRAM PROCEDURE REPORT  PATIENT NAME: Montrell Rboerto  DATE OF PROCEDURE: 7/16/2024  ATTENDING: Ambrosio Godoy MD  PREOPERATIVE DIAGNOSIS: 1. Right M1 occluison  POSTOPERATIVE DIAGNOSIS: 1. Right M1 occlusion  OPERATION: 1. Diagnostic cerebral angiogram 2. Ultrasound guided arterial access 3. Right middle cerebral artery mechanical thrombectomy  ANESTHESIA: MAC administered by anesthesiology  COMPLICATIONS: none  INDICATIONS: The patient is an 84 year old male who presented with acute onset left sided weakness and  was found to have a right M1 occlusion. He was administered TNK.     Recommendations for nursing staff:   Transfers: one person DAVID and AMADA  Toileting location: toilet    EVALUATION SESSION:  Patient Start of Session: supine  FUNCTIONAL TRANSFER ASSESSMENT  Sit to Stand: Edge of Bed; Chair  Edge of Bed: Stand-by Assist  Chair: Stand-by Assist  Toilet Transfer: Stand-by Assist    BED MOBILITY  Rolling: Supervision  Supine to Sit : Supervision  Sit to Supine (OT): Supervision  Scooting: supervision    BALANCE ASSESSMENT  Static Sitting: Modified Independent  Static Standing: Supervision    FUNCTIONAL ADL ASSESSMENT  Eating: Modified Independent  Grooming Seated: Modified Independent  LB Dressing Seated: Supervision  Toileting Seated: Supervision      ACTIVITY TOLERANCE: WFL                         O2 SATURATIONS       COGNITION  Attention Span:  attends with cues to redirect  Orientation Level:  oriented x4  Memory:  impaired working memory  Following Commands:  follows one step commands with increased time, follows one step commands with repetition, and follows multistep commands with increased time  Initiation: appears intact  Motor Planning: impaired  Perseveration: not present  Safety Judgement:  decreased awareness of need for safety  Awareness of Errors:  decreased awareness of errors   Awareness of Deficits:  decreased awareness of deficits  Problem Solving:  assistance required to generate solutions  COGNITION ASSESSMENTS  Clock Draw: pt placed all numbers from mid [place] to the right of the [place], w/ left upper and lower quadrants of [place] left empty; pt placed hour hand correctly and did not place hour hand      VISION  Head Position:  WDL = within defined limits  Tracking:  able to track stimulus in all quads without difficulty  Peripheral:  impaired and left side    PERCEPTION  Left Right Discrimination:   intact  Left Attention:   impaired    Communication: WFL    Behavioral/Emotional/Social: pleasant  and cooperative    UPPER EXTREMITY  ROM: within functional limits   Strength: is within functional limits   Coordination  Gross motor: WFL  Fine motor: WFL  Sensation: Light touch:  intact  Proprioception:  intact  Stereognosis:  intact    Neurological findings:  Neurological Findings: Coordination - Finger to Nose;Coordination - Finger Opposition  Coordination - Finger to Nose: Symmetrical     Coordination - Finger Opposition: Symmetrical       EDUCATION PROVIDED  Patient: Role of Occupational Therapy; Plan of Care; Discharge Recommendations  Patient's Response to Education: Verbalized Understanding  Equipment used: RW  Demonstrates functional use, Would benefit from additional trial      Therapist comments: OT educated patient on safety,  sequencing, energy conservation, pain management, home modifications and adaptive equipment to increase independence with ADLs.    Therapist led patient to complete PHQ9, pt scored 1 on PHQ9.  Scores demonstrate:  0-4 - Min risk of depression  5-9 - Mild risk of depression  10-14 - Moderate risk of depression  15-19 - Moderately severe risk of depression  20-27 - Severe risk of depression      Patient End of Session: Up in chair;With  staff;RN aware of session/findings;Call light within reach;Needs met;All patient questions and concerns addressed;Alarm set;Discussed recommendations with /    OCCUPATIONAL PROFILE    HOME SITUATION  Type of Home: House  Home Layout: One level  Lives With: Spouse    Toilet and Equipment: Standard height toilet;Toilet riser with arms  Shower/Tub and Equipment: Walk-in shower;Shower chair;Grab bar  Other Equipment: Other (Comment) (walker)    Occupation/Status: retired  Hand Dominance: Right  Drives: No  Patient Regularly Uses: Reading glasses    Prior Level of Function: independent with ADL without the use of adaptive device.  Patient reports a fall down the stairs in Jan/2024 and states he went to rehab at that  time.    SUBJECTIVE   PAIN ASSESSMENT  Ratin  Location: denies       OBJECTIVE  Precautions:  (-160)  Fall Risk: High fall risk      ASSESSMENTS  AM-PAC ‘6-Clicks’ Inpatient Daily Activity Short Form  -   Putting on and taking off regular lower body clothing?: A Little  -   Bathing (including washing, rinsing, drying)?: A Little  -   Toileting, which includes using toilet, bedpan or urinal? : A Little  -   Putting on and taking off regular upper body clothing?: None  -   Taking care of personal grooming such as brushing teeth?: None  -   Eating meals?: None    AM-PAC Score:  Score: 21  Approx Degree of Impairment: 32.79%  Standardized Score (AM-PAC Scale): 44.27    ADDITIONAL TESTS  PHQ9: Yes    NEUROLOGICAL FINDINGS  Coordination - Finger to Nose: Symmetrical  Coordination - Finger Opposition: Symmetrical       PLAN  OT Treatment Plan: Balance activities;Energy conservation/work simplification techniques;ADL training;Visual perceptual training;Functional transfer training;UE strengthening/ROM;Cognitive reorientation;Patient/Family education;Patient/Family training;Equipment eval/education;Compensatory technique education  Rehab Potential : Good  Frequency: 3-5x/week  Number of Visits to Meet Established Goals: 6    ADL GOALS:  Patient will perform lower body dressing w/ supervision and with adaptive equipment PRN  Patient will perform toileting with supervision and with adaptive equipment PRN.    Functional Transfer Goals:  Patient will transfer from sit to supine:  with supervision  Patient will transfer from supine to sit:  with supervision  Patient will transfer to toilet:  with supervision    ADDITIONAL GOALS:  Patient will independently adhere to precautions during self-care and functional mobility tasks.      Patient Evaluation Complexity Level:   Occupational Profile/Medical History LOW - Brief history including review of medical or therapy records    Specific performance deficits impacting  engagement in ADL/IADL LOW  1 - 3 performance deficits    Client Assessment/Performance Deficits LOW - No comorbidities nor modifications of tasks    Clinical Decision Making LOW - Analysis of occupational profile, problem-focused assessments, limited treatment options    Overall Complexity LOW     OT Session Time: 30 minutes  Self-Care Home Management: 15 minutes

## 2024-07-18 NOTE — PROGRESS NOTES
[facility]  Neurosurgery Progress Note    Montrell Roberto Patient Status:  Inpatient    1940 MRN [id number]   Location [facility] 7NE-A Attending Oliver Milan MD   Hosp Day # 2 PCP Enma Clark MD     Chief Complaint:  Acute stroke    Subjective:  No events overnight. No complaints.    Objective/Physical Exam:    Vital Signs:  Blood pressure 90/50, pulse 60, temperature 97.5 °F (36.4 °C), temperature source Oral, resp. rate 15, weight 133 lb 13.1 oz (60.7 kg), SpO2 99%.  Respiratory:  nonlabored  CV:  well perfused  General: NAD, Speech Fluent, Mood Appropriate  Neurologic:   A&Ox3  PERRL, EOMi, FS, TM  Full strength x 4, no drift      Labs:  Recent Labs   Lab 24  1628 24  0452   RBC 4.35 3.40*   HGB 13.6 10.5*   HCT 39.3 30.9*   MCV 90.3 90.9   MCH 31.3 30.9   MCHC 34.6 34.0   RDW 12.9 12.9   NEPRELIM 3.22  --    WBC 6.1 4.4   .0 101.0*       Recent Labs   Lab 24  1628 24  0452   GLU 99 84   BUN 20 15   CREATSERUM 1.22 0.94   EGFRCR 59* 81   CA 9.1 8.3*    139   K 3.7 4.1  4.1    110   CO2 26.8 26.0       Imaging:  CT BRAIN OR HEAD (87439)    Result Date: 2024  CONCLUSION:  1. No acute intracranial hemorrhage. 2. Mild to moderate age indeterminate small vessel ischemic disease. If there is clinical concern for acute ischemia/infarction, an MRI of the brain would be recommended for further evaluation.    LOCATION:  [id number]   Dictated by (CST): Stromberg, LeRoy, MD on 2024 at 4:16 PM     Finalized by (CST): Stromberg, LeRoy, MD on 2024 at 4:21 PM       IR INTRA ARTERIAL STROKE INTERVENTION    Result Date: 2024  CEREBRAL ANGIOGRAM PROCEDURE REPORT  PATIENT NAME: Montrell Roberto  DATE OF PROCEDURE: 2024  ATTENDING: Ambrosio Godoy MD  PREOPERATIVE DIAGNOSIS: 1. Right M1 occluison  POSTOPERATIVE DIAGNOSIS: 1. Right M1 occlusion  OPERATION: 1. Diagnostic cerebral angiogram 2. Ultrasound guided arterial access 3.  Right middle cerebral artery mechanical thrombectomy  ANESTHESIA: MAC administered by anesthesiology  COMPLICATIONS: none  INDICATIONS: The patient is an 84 year old male who presented with acute onset left sided weakness and was found to have a right M1 occlusion. He was administered TNK. See medical record for further detail.   PROCEDURE: Following explanation of the benefits, risks and alternatives for the procedure, informed consent was obtained from the patient's daughter who is his power of . The risks including but not limited to stroke, intracranial hemorrhage, vascular injury to the cervical or femoral vessels, groin hematoma, and death were discussed with the patient, his wife, and his daughter. A time-out was performed. Both groins were prepped in the usual sterile fashion using Chloroprep, and sterilely draped. Using ultrasound guidance (permanent image saved), a single wall puncture of the right femoral artery was performed; and a 8-Fr short sheath was inserted into the right common femoral artery. The sheath was then flushed with sterile flushes in the usual fashion.  Using coaxial technique, a Walrus balloon guide catheter with a Winchester-2 select catheter were advanced into the aortic arch, and with the aid of the roadmapping, digital fluoroscopy, and careful guidewire manipulation the right common carotid and right internal carotid arteries were catheterized. Upon each successive selective catheterization, digital subtraction angiography using the appropriate rate and volume of contrast in multiple projections was performed.   FINDINGS:  RIGHT COMMON CAROTID ARTERY (ROADMAP, PA, LATERAL, CERVICAL) The origins of the right internal and external carotid arteries are widely patent without evidence of ulceration or stenosis. The visualized portions of the external carotid artery and its branches are normal without evidence of ulceration or stenosis. There is no evidence of arteriovenous shunting.   RIGHT INTERNAL CAROTID ARTERY (DSA, PA, LATERAL, HEAD) There is a right M1 occlusion.  ENDOVASCULAR INTERVENTION: With the Walrus balloon guide catheter in the distal cervical ICA, the Simmon-2 catheter was removed.  A 6-Fr Eleonora aspiration catheter, a Marksman microcatheter, and Michael 024 microwire were then coaxially advanced through the guide catheter into the right internal carotid artery.  The microcatheter and microwire were advanced to the point of occlusion, and the aspiration catheter was advanced to the face of the clot.  The microcatheter and microwire were removed and the aspiration catheter was placed on continuous suction.  The catheter was allowed to intercalate with the thrombus, the balloon was inflated, and the aspiration catheter was removed.  The balloon was deflated and a repeat right ICA angiogram demonstrated TICI2b revascularization.   Repeat right ICA angiograms demonstrated two small M3 branch occlusions feeding in the right frontal lobe with retrograde filling of that territory from pial collateral vessels.  The guide catheter was retracted into the common carotid artery and a repeat angiogram of the neck demonstrated a non-flow limiting Biffl grade 1 dissection of the proximal right cervical internal carotid artery.  The Walrus was removed.  The sheath was aspirated and a right femoral artery angiogram was performed which demonstrated puncture above the bifurcation and below the inferior epigastric.  The femoral sheath was then removed and hemostasis was achieved with an 8-Fr Angioseal closure device. The patient tolerated the procedure well and was transported from the angiography suite to the ICU in stable condition.  CONCLUSION Angiographic study demonstrates: 1. Right M1 occlusion with TICI 2b revascularization following one aspiration thrombectomy pass 2. Proximal right cervical internal carotid artery non-flow limiting dissection   Dictated by (CST): Ambrosio oGdoy DR on  7/16/2024 at 8:49 PM     Finalized by (CST): Ambrosio Godoy DR on 7/16/2024 at 8:56 PM       XR CHEST AP PORTABLE  (CPT=71045)    Result Date: 7/16/2024  PROCEDURE:  XR CHEST AP PORTABLE  (CPT=71045)  TECHNIQUE:  AP chest radiograph was obtained.  COMPARISON:  None.  INDICATIONS:  stroke  PATIENT STATED HISTORY: (As transcribed by Technologist)     FINDINGS: Cardiac silhouette and pulmonary vasculature are unremarkable.  Left-sided pacemaker and leads are noted. No consolidation, pleural effusion or pneumothorax. IMPRESSION: Lateral portion of the left side of the chest is excluded.  No consolidation.   LOCATION:  [facility]      Dictated by (CST): Live Little MD on 7/16/2024 at 8:48 PM     Finalized by (CST): Live Little MD on 7/16/2024 at 8:48 PM       CT BRAIN OR HEAD (80453)    Result Date: 7/16/2024  PROCEDURE:  CT BRAIN OR HEAD (41371)  COMPARISON:  [facility] , CT, CT STROKE (ALEX) CTA BRAIN/CTA NECK+PERF(CPT=70496/68901/0042T), 7/16/2024, 4:40 PM.  INDICATIONS:  post stroke intervention  TECHNIQUE:  Noncontrast CT scanning is performed through the brain. Dose reduction techniques were used. Dose information is transmitted to the ACR (American College of Radiology) NRDR (National Radiology Data Registry) which includes the Dose Index Registry.  PATIENT STATED HISTORY: (As transcribed by Technologist)  Patient is status post IR stroke intervention.    FINDINGS: No evidence of intracranial hemorrhage or extra-axial fluid collection. Lucencies in the deep periventricular white matter are likely sequelae of chronic small vessel ischemic disease. Prominence of the lateral and 3rd ventricles is noted.  Mild prominence of the sulci. Contrast from recent examinations is noted.  There appears to be interval opacification of the right M1 segment along with multiple right M2 branches. Visualized portions of paranasal sinuses are unremarkable. Visualized portions of the mastoid air cells are unremarkable.  Visualized portions of the orbits are unremarkable. IMPRESSION: 1. No evidence of intracranial hemorrhage or extra-axial fluid collection.  Gray-white differentiation is relatively symmetric.  2. Though this is a noncontrast examination, the patient has contrast on board from recent examinations.  The previously noted occlusion at right M1 segment appears to have been treated.  There is interval opacification of right M1 M2 branches when compared to prior exam.     LOCATION:  [facility]   Dictated by (CST): Live Little MD on 7/16/2024 at 7:23 PM     Finalized by (CST): Live Little MD on 7/16/2024 at 7:25 PM       CT STROKE (ALEX) CTA BRAIN/CTA NECK+PERF(CPT=70496/82708/0042T)    Result Date: 7/16/2024  CONCLUSION:   1. No significant stenosis or aneurysmal dilatation involving the major arterial structures in the neck.  2. Obstruction of the mid right M1 segment with associated marked perfusion abnormality suggesting marked ischemia throughout the entire right MCA territory.  T-max greater than 6 seconds is 148 mL. Mismatch volume is 137 mL.  This critical result was discussed with Dr. Gr at 1708 hours on 7/16/2024. Read back was performed.    LOCATION:  [facility]   Dictated by (CST): Live Little MD on 7/16/2024 at 5:03 PM     Finalized by (CST): Live Little MD on 7/16/2024 at 5:08 PM       CT STROKE BRAIN (NO IV)(CPT=70450)    Result Date: 7/16/2024  CONCLUSION:  1. No acute intracranial hemorrhage or hydrocephalus. 2. Mild age indeterminate small vessel ischemic disease. If there is clinical concern for acute ischemia/infarction, an MRI of the brain would be recommended for further evaluation.  Critical results were discussed with Dr. rG at 1643 hours on 7/16/2024. Critical results were read back.   LOCATION:  [facility]   Dictated by ([facility]): Stromberg, LeRoy, MD on 7/16/2024 at 4:39 PM     Finalized by (CST): Stromberg, LeRoy, MD on 7/16/2024 at 4:44 PM           Assessment:  83 yo male with  acute ischemic stroke from right M1 occlusion  AMANDA cervical dissection following thrombectomy    Plan:  S/p mechanical thrombectomy  Medical management/ stroke work up per [facility]  24 hour CT head OK  MRI brain pending  Aspirin 325mg  DVT prophylaxis SCD, ok for DVT chemo prophylaxis  Follow up in 1 month         Is this a shared or split note between Advanced Practice Provider and Physician? BROCK Best  [facility]  7/18/2024, 4:02 PM  Spectre [id number]

## 2024-07-19 LAB
ANION GAP SERPL CALC-SCNC: 3 MMOL/L (ref 0–18)
BASOPHILS # BLD AUTO: 0.03 X10(3) UL (ref 0–0.2)
BASOPHILS NFR BLD AUTO: 0.8 %
BUN BLD-MCNC: 13 MG/DL (ref 9–23)
CALCIUM BLD-MCNC: 8.5 MG/DL (ref 8.7–10.4)
CHLORIDE SERPL-SCNC: 109 MMOL/L (ref 98–112)
CO2 SERPL-SCNC: 28 MMOL/L (ref 21–32)
CREAT BLD-MCNC: 0.92 MG/DL
EGFRCR SERPLBLD CKD-EPI 2021: 83 ML/MIN/1.73M2 (ref 60–?)
EOSINOPHIL # BLD AUTO: 0.13 X10(3) UL (ref 0–0.7)
EOSINOPHIL NFR BLD AUTO: 3.6 %
ERYTHROCYTE [DISTWIDTH] IN BLOOD BY AUTOMATED COUNT: 12.7 %
GLUCOSE BLD-MCNC: 100 MG/DL (ref 70–99)
GLUCOSE BLD-MCNC: 101 MG/DL (ref 70–99)
GLUCOSE BLD-MCNC: 113 MG/DL (ref 70–99)
GLUCOSE BLD-MCNC: 156 MG/DL (ref 70–99)
GLUCOSE BLD-MCNC: 87 MG/DL (ref 70–99)
HCT VFR BLD AUTO: 29.3 %
HGB BLD-MCNC: 9.9 G/DL
IMM GRANULOCYTES # BLD AUTO: 0.02 X10(3) UL (ref 0–1)
IMM GRANULOCYTES NFR BLD: 0.6 %
LYMPHOCYTES # BLD AUTO: 0.79 X10(3) UL (ref 1–4)
LYMPHOCYTES NFR BLD AUTO: 21.8 %
MCH RBC QN AUTO: 30.8 PG (ref 26–34)
MCHC RBC AUTO-ENTMCNC: 33.8 G/DL (ref 31–37)
MCV RBC AUTO: 91.3 FL
MONOCYTES # BLD AUTO: 0.39 X10(3) UL (ref 0.1–1)
MONOCYTES NFR BLD AUTO: 10.7 %
NEUTROPHILS # BLD AUTO: 2.27 X10 (3) UL (ref 1.5–7.7)
NEUTROPHILS # BLD AUTO: 2.27 X10(3) UL (ref 1.5–7.7)
NEUTROPHILS NFR BLD AUTO: 62.5 %
OSMOLALITY SERPL CALC.SUM OF ELEC: 289 MOSM/KG (ref 275–295)
PLATELET # BLD AUTO: 90 10(3)UL (ref 150–450)
POTASSIUM SERPL-SCNC: 3.9 MMOL/L (ref 3.5–5.1)
RBC # BLD AUTO: 3.21 X10(6)UL
SODIUM SERPL-SCNC: 140 MMOL/L (ref 136–145)
TROPONIN I SERPL HS-MCNC: 8 NG/L
WBC # BLD AUTO: 3.6 X10(3) UL (ref 4–11)

## 2024-07-19 PROCEDURE — 99233 SBSQ HOSP IP/OBS HIGH 50: CPT | Performed by: OTHER

## 2024-07-19 PROCEDURE — 99231 SBSQ HOSP IP/OBS SF/LOW 25: CPT | Performed by: NURSE PRACTITIONER

## 2024-07-19 PROCEDURE — 99232 SBSQ HOSP IP/OBS MODERATE 35: CPT | Performed by: HOSPITALIST

## 2024-07-19 RX ORDER — ASPIRIN 325 MG
325 TABLET ORAL NIGHTLY
Qty: 30 TABLET | Refills: 1 | Status: SHIPPED | OUTPATIENT
Start: 2024-07-19

## 2024-07-19 NOTE — PLAN OF CARE
Assumed care at 1930.  A&Ox3-4, forgetful.  Tele Avpaced, on room air. Desat while sleeping, states known VILMA never wore CPAP. 2L NC applied.  Neuros intact.  Awaiting MRI, awaiting PM info.  Pain to PM site, chronic, declines meds.  Voiding per urinal.  Patient updated on POC.

## 2024-07-19 NOTE — PLAN OF CARE
Assumed care 0730  A+Ox4, RA, AV paced on tele  Cards consult re EF   -record request sent to [facility]   -no response yet  Pacer interrogated  Saline locked per parameters  Tolerating PO intake  Intermittent retention   -refuses straight cath  Pt/family updated on plan of care  Call light in reach, needs addressed

## 2024-07-19 NOTE — PROGRESS NOTES
[facility]   part of [facility]     Hospitalist Progress Note     Montrell Roberto Patient Status:  Inpatient    1940 MRN [id number]   Location [facility] 6NE-A Attending Oliver Milan MD   Hosp Day # 3 PCP Enma Clark MD     Chief Complaint: L sided weakness    Subjective:     No complaints but later had CP at ppm site    Objective:    Review of Systems:   A comprehensive review of systems was completed; pertinent positive and negatives stated in subjective.    Vital signs:  Temp:  [97.6 °F (36.4 °C)-98.5 °F (36.9 °C)] 98.5 °F (36.9 °C)  Pulse:  [59-61] 60  Resp:  [13-21] 16  BP: (103-110)/(53-69) 103/53  SpO2:  [94 %-98 %] 94 %    Physical Exam:    General: No acute distress  Respiratory: No wheezes, no rhonchi  Cardiovascular: S1, S2, regular rate and rhythm  Abdomen: Soft, Non-tender, non-distended, positive bowel sounds  Neuro: No new focal deficits. Weakness resolved  Extremities: No edema      Diagnostic Data:    Labs:  Recent Labs   Lab 24  0452 24  0816   WBC 6.1 4.4 3.6*   HGB 13.6 10.5* 9.9*   MCV 90.3 90.9 91.3   .0 101.0* 90.0*   INR 1.05  --   --        Recent Labs   Lab 24  0452 24  0816   GLU 99 84 87   BUN 20 15 13   CREATSERUM 1.22 0.94 0.92   CA 9.1 8.3* 8.5*   ALB 4.3  --   --     139 140   K 3.7 4.1  4.1 3.9    110 109   CO2 26.8 26.0 28.0   ALKPHO 104  --   --    AST 26  --   --    ALT 24  --   --    BILT 0.5  --   --    TP 7.0  --   --        Estimated Creatinine Clearance: 51.8 mL/min (based on SCr of 0.92 mg/dL).    Recent Labs   Lab 248   TROPHS 8.22       Recent Labs   Lab 24  1628   PTP 13.7   INR 1.05                  Microbiology    No results found for this visit on 24.      Imaging: Reviewed in Epic.    Medications:    mupirocin  1 Application Nasal BID    aspirin  325 mg Oral Nightly    ketotifen  1 drop Both Eyes BID    atorvastatin  40 mg Oral Nightly     amiodarone  200 mg Oral Daily    divalproex DR  125 mg Oral TID    melatonin  3 mg Oral Nightly    memantine  10 mg Oral BID    mexiletine  150 mg Oral TID    QUEtiapine  25 mg Oral Nightly    [Held by provider] metoprolol tartrate  25 mg Oral BID    QUEtiapine  12.5 mg Oral Every afternoon at 1400       Assessment & Plan:      #acute MCA right M1 occlusion s/p TNK and IR thrombectomy  -symptoms resolved  -BP goals and timing aspirin deferred to neuroCC/neuroIR  -Statin  -A1c 5.5  -  -no MRI due to ppm    #dementia    #hx dilated cardiomyopathy with ablation and AICD/ppm  #chronic systolic CHF  -hold BB  -echo ef 25-30%, hypokinesis. No shunt  -previous echo 2015 w/ EF 45%  -cards consult  -ppm interrogation  -records reviewed from [facility]: - Echo from 06/2023 with LVEF 40-45%,  diffuse hypokinesis, RV cavity size increased, septal motion dyssynergic   - Cath 6/9/23 LM 0%, LAD dense proximal calcium merge 50%, Lcx mid high OM1 prox 50%, RCA 10%    #eyelid droop -2/2 CVA?  -feels like sand in eye for the past few weeks. Possibly allergy? Can try pataday eye gtts.     #anemia  -dilution    #cad  #HTN    DISPO: can DC from my standpoint. Await cards kevs    Oliver Milan MD    Supplementary Documentation:     Quality:  DVT Mechanical Prophylaxis:   SCDs,    DVT Pharmacologic Prophylaxis   Medication   None    DVT Pharmacologic prophylaxis: Aspirin 325 mg           Code Status: Not on file  Mehta: No urinary catheter in place  Mehta Duration (in days):   Central line:    JOSI: 7/19/2024    Discharge is dependent on: course  At this point Mr. Roberto is expected to be discharge to: tbd    The 21st Century Cures Act makes medical notes like these available to patients in the interest of transparency. Please be advised this is a medical document. Medical documents are intended to carry relevant information, facts as evident, and the clinical opinion of the practitioner. The medical note is intended as peer to peer  communication and may appear blunt or direct. It is written in medical language and may contain abbreviations or verbiage that are unfamiliar.

## 2024-07-19 NOTE — PLAN OF CARE
A&Ox3-4, forgetful.  Tele Avpaced, on room air  Neuros checks intact. See flowsheet  Voiding per urinal. Intermittent retention noted  MRI not done d/t pacemaker incompatibility  Pain to PM site, chronic, declines meds. Troponin is WNL  Patient and family updated on POC and discharge plan.

## 2024-07-19 NOTE — CM/SW NOTE
07/19/24 1000   CM/SW Referral Data   Reason for Referral Discharge planning;Protocol order set   Discharge Needs   Anticipated D/C needs Home health care     SW consult for Home Health Eval. Pt admitted on 7/16/2024 from home for left sided weakness, aphasia.  Pt diagnosed with acute MCA R M1 occlusion s/p TNK and IR thrombectomy 7/16.     Pt identified as SCREEN patient, referral sent to [facility]. Liaison to meet and discuss service with pt/family.     SW to remain available for any additional DC planning needs/recs.    EFFIE Solomon

## 2024-07-19 NOTE — PROGRESS NOTES
[facility]  [facility] Neurology Progress Note    Montrell Roberto Patient Status:  Inpatient    1940 MRN [id number]   Location [facility] 7NE-A Attending Oliver Milan MD   Hosp Day # 3 PCP Enma Clark MD     CC: Acute stroke    Subjective:  Patient seen for a follow up visit today. In the chair, daughter is at the bedside.   Currently, awake, alert and oriented x 3-4. Denies nay nausea, no headache, no new focal weakness or paresthesias, no double or blurry vision.         MEDICATIONS:  No current outpatient medications on file.     Current Facility-Administered Medications   Medication Dose Route Frequency    mupirocin (Bactroban) 2% nasal ointment 1 Application  1 Application Nasal BID    aspirin tab 325 mg  325 mg Oral Nightly    ketotifen (Zaditor) 0.035 % ophthalmic solution 1 drop  1 drop Both Eyes BID    acetaminophen (Tylenol) tab 650 mg  650 mg Oral Q4H PRN    Or    acetaminophen (Tylenol) rectal suppository 650 mg  650 mg Rectal Q4H PRN    labetalol (Trandate) 5 mg/mL injection 10 mg  10 mg Intravenous Q10 Min PRN    hydrALAzine (Apresoline) 20 mg/mL injection 10 mg  10 mg Intravenous Q2H PRN    ondansetron (Zofran) 4 MG/2ML injection 4 mg  4 mg Intravenous Q6H PRN    acetaminophen (Tylenol) tab 650 mg  650 mg Oral Q4H PRN    Or    acetaminophen (Tylenol) rectal suppository 650 mg  650 mg Rectal Q4H PRN    melatonin tab 3 mg  3 mg Oral Nightly PRN    polyethylene glycol (PEG 3350) (Miralax) 17 g oral packet 17 g  17 g Oral Daily PRN    sennosides (Senokot) tab 17.2 mg  17.2 mg Oral Nightly PRN    bisacodyl (Dulcolax) 10 MG rectal suppository 10 mg  10 mg Rectal Daily PRN    benzonatate (Tessalon) cap 200 mg  200 mg Oral TID PRN    glycerin-hypromellose- (Artificial Tears) 0.2-0.2-1 % ophthalmic solution 1 drop  1 drop Both Eyes QID PRN    sodium chloride (Saline Mist) 0.65 % nasal solution 1 spray  1 spray Each Nare Q3H PRN    sodium chloride 0.9% infusion   Intravenous  Continuous    niCARdipine in sodium chloride 0.86% (carDENE) 20 mg/200mL infusion premix  5-15 mg/hr Intravenous Continuous PRN    ondansetron (Zofran) 4 MG/2ML injection 4 mg  4 mg Intravenous Q6H PRN    atorvastatin (Lipitor) tab 40 mg  40 mg Oral Nightly    amiodarone (Pacerone) tab 200 mg  200 mg Oral Daily    divalproex DR (Depakote) tab 125 mg  125 mg Oral TID    melatonin tab 3 mg  3 mg Oral Nightly    memantine (Namenda) tab 10 mg  10 mg Oral BID    mexiletine (Mexitil) cap 150 mg  150 mg Oral TID    QUEtiapine (SEROquel) tab 25 mg  25 mg Oral Nightly    [Held by provider] metoprolol tartrate (Lopressor) tab 25 mg  25 mg Oral BID    QUEtiapine (SEROquel) partial tab 12.5 mg  12.5 mg Oral Every afternoon at 1400    ALPRAZolam (Xanax) tab 0.25 mg  0.25 mg Oral TID PRN       REVIEW OF SYSTEMS:  A 10-point system was reviewed.  Pertinent positives and negatives are noted in HPI.      PHYSICAL EXAMINATION:  VITAL SIGNS: /69 (BP Location: Left arm)   Pulse 59   Temp 97.9 °F (36.6 °C) (Temporal)   Resp 18   Wt 133 lb 13.1 oz (60.7 kg)   SpO2 94%   BMI 22.97 kg/m²   GENERAL:  Patient is a 84 year old male in no acute distress.  HEENT:  Normocephalic, atraumatic  ABD: Soft, non tender  SKIN: Warm, dry, no rashes    NEUROLOGICAL:   Mental status: Oriented to person, place, and time   Speech: Fluent, no dysarthria  Memory and comprehension: Intact   Cranial Nerves: VFF, PERRL 3mm brisk, EOMI, no nystagmus, facial sensation intact, face symmetric, tongue midline, shoulder shrug equal, remainder CN intact  Motor: No drift, no focal arm or leg weakness. Motor strength is 5 out of 5 in all extremities.    Sensory: Intact to light touch  Coordination: FTN intact  Gait: Deferred      Imaging/Diagnostics:  CT BRAIN OR HEAD (13414)    Result Date: 7/17/2024  CONCLUSION:  1. No acute intracranial hemorrhage. 2. Mild to moderate age indeterminate small vessel ischemic disease. If there is clinical concern for acute  ischemia/infarction, an MRI of the brain would be recommended for further evaluation.    LOCATION:  [id number]   Dictated by (CST): Stromberg, LeRoy, MD on 7/17/2024 at 4:16 PM     Finalized by (CST): Stromberg, LeRoy, MD on 7/17/2024 at 4:21 PM       IR INTRA ARTERIAL STROKE INTERVENTION    Result Date: 7/16/2024  CEREBRAL ANGIOGRAM PROCEDURE REPORT  PATIENT NAME: Montrell Roberto  DATE OF PROCEDURE: 7/16/2024  ATTENDING: Ambrosio Godoy MD  PREOPERATIVE DIAGNOSIS: 1. Right M1 occluison  POSTOPERATIVE DIAGNOSIS: 1. Right M1 occlusion  OPERATION: 1. Diagnostic cerebral angiogram 2. Ultrasound guided arterial access 3. Right middle cerebral artery mechanical thrombectomy  ANESTHESIA: MAC administered by anesthesiology  COMPLICATIONS: none  INDICATIONS: The patient is an 84 year old male who presented with acute onset left sided weakness and was found to have a right M1 occlusion. He was administered TNK. See medical record for further detail.   PROCEDURE: Following explanation of the benefits, risks and alternatives for the procedure, informed consent was obtained from the patient's daughter who is his power of . The risks including but not limited to stroke, intracranial hemorrhage, vascular injury to the cervical or femoral vessels, groin hematoma, and death were discussed with the patient, his wife, and his daughter. A time-out was performed. Both groins were prepped in the usual sterile fashion using Chloroprep, and sterilely draped. Using ultrasound guidance (permanent image saved), a single wall puncture of the right femoral artery was performed; and a 8-Fr short sheath was inserted into the right common femoral artery. The sheath was then flushed with sterile flushes in the usual fashion.  Using coaxial technique, a Walrus balloon guide catheter with a Winchester-2 select catheter were advanced into the aortic arch, and with the aid of the roadmapping, digital fluoroscopy, and careful guidewire manipulation  the right common carotid and right internal carotid arteries were catheterized. Upon each successive selective catheterization, digital subtraction angiography using the appropriate rate and volume of contrast in multiple projections was performed.   FINDINGS:  RIGHT COMMON CAROTID ARTERY (ROADMAP, PA, LATERAL, CERVICAL) The origins of the right internal and external carotid arteries are widely patent without evidence of ulceration or stenosis. The visualized portions of the external carotid artery and its branches are normal without evidence of ulceration or stenosis. There is no evidence of arteriovenous shunting.  RIGHT INTERNAL CAROTID ARTERY (DSA, PA, LATERAL, HEAD) There is a right M1 occlusion.  ENDOVASCULAR INTERVENTION: With the Walrus balloon guide catheter in the distal cervical ICA, the Simmon-2 catheter was removed.  A 6-Fr Eleonora aspiration catheter, a SI2 - [employer] microcatheter, and Michael 024 microwire were then coaxially advanced through the guide catheter into the right internal carotid artery.  The microcatheter and microwire were advanced to the point of occlusion, and the aspiration catheter was advanced to the face of the clot.  The microcatheter and microwire were removed and the aspiration catheter was placed on continuous suction.  The catheter was allowed to intercalate with the thrombus, the balloon was inflated, and the aspiration catheter was removed.  The balloon was deflated and a repeat right ICA angiogram demonstrated TICI2b revascularization.   Repeat right ICA angiograms demonstrated two small M3 branch occlusions feeding in the right frontal lobe with retrograde filling of that territory from pial collateral vessels.  The guide catheter was retracted into the common carotid artery and a repeat angiogram of the neck demonstrated a non-flow limiting Biffl grade 1 dissection of the proximal right cervical internal carotid artery.  The Walrus was removed.  The sheath was aspirated and a  right femoral artery angiogram was performed which demonstrated puncture above the bifurcation and below the inferior epigastric.  The femoral sheath was then removed and hemostasis was achieved with an 8-Fr Angioseal closure device. The patient tolerated the procedure well and was transported from the angiography suite to the ICU in stable condition.  CONCLUSION Angiographic study demonstrates: 1. Right M1 occlusion with TICI 2b revascularization following one aspiration thrombectomy pass 2. Proximal right cervical internal carotid artery non-flow limiting dissection   Dictated by (CST): Ambrosio Godoy DR on 7/16/2024 at 8:49 PM     Finalized by (CST): Ambrosio Godoy DR on 7/16/2024 at 8:56 PM       XR CHEST AP PORTABLE  (CPT=71045)    Result Date: 7/16/2024  PROCEDURE:  XR CHEST AP PORTABLE  (CPT=71045)  TECHNIQUE:  AP chest radiograph was obtained.  COMPARISON:  None.  INDICATIONS:  stroke  PATIENT STATED HISTORY: (As transcribed by Technologist)     FINDINGS: Cardiac silhouette and pulmonary vasculature are unremarkable.  Left-sided pacemaker and leads are noted. No consolidation, pleural effusion or pneumothorax. IMPRESSION: Lateral portion of the left side of the chest is excluded.  No consolidation.   LOCATION:  [facility]      Dictated by (CST): Live Little MD on 7/16/2024 at 8:48 PM     Finalized by (CST): Live Little MD on 7/16/2024 at 8:48 PM       CT BRAIN OR HEAD ([id number])    Result Date: 7/16/2024  PROCEDURE:  CT BRAIN OR HEAD (95034)  COMPARISON:  [facility] , CT, CT STROKE (ALEX) CTA BRAIN/CTA NECK+PERF(CPT=70496/17518/0042T), 7/16/2024, 4:40 PM.  INDICATIONS:  post stroke intervention  TECHNIQUE:  Noncontrast CT scanning is performed through the brain. Dose reduction techniques were used. Dose information is transmitted to the ACR (American College of Radiology) NRDR (National Radiology Data Registry) which includes the Dose Index Registry.  PATIENT STATED HISTORY: (As transcribed by  Technologist)  Patient is status post IR stroke intervention.    FINDINGS: No evidence of intracranial hemorrhage or extra-axial fluid collection. Lucencies in the deep periventricular white matter are likely sequelae of chronic small vessel ischemic disease. Prominence of the lateral and 3rd ventricles is noted.  Mild prominence of the sulci. Contrast from recent examinations is noted.  There appears to be interval opacification of the right M1 segment along with multiple right M2 branches. Visualized portions of paranasal sinuses are unremarkable. Visualized portions of the mastoid air cells are unremarkable. Visualized portions of the orbits are unremarkable. IMPRESSION: 1. No evidence of intracranial hemorrhage or extra-axial fluid collection.  Gray-white differentiation is relatively symmetric.  2. Though this is a noncontrast examination, the patient has contrast on board from recent examinations.  The previously noted occlusion at right M1 segment appears to have been treated.  There is interval opacification of right M1 M2 branches when compared to prior exam.     LOCATION:  [facility]   Dictated by ([facility]): Live Little MD on 7/16/2024 at 7:23 PM     Finalized by (CST): Live Little MD on 7/16/2024 at 7:25 PM       CT STROKE (ALEX) CTA BRAIN/CTA NECK+PERF(CPT=70496/90349/0042T)    Result Date: 7/16/2024  CONCLUSION:   1. No significant stenosis or aneurysmal dilatation involving the major arterial structures in the neck.  2. Obstruction of the mid right M1 segment with associated marked perfusion abnormality suggesting marked ischemia throughout the entire right MCA territory.  T-max greater than 6 seconds is 148 mL. Mismatch volume is 137 mL.  This critical result was discussed with Dr. Gr at 1708 hours on 7/16/2024. Read back was performed.    LOCATION:  [facility]   Dictated by ([facility]): Live Little MD on 7/16/2024 at 5:03 PM     Finalized by (CST): Live Little MD on 7/16/2024 at 5:08 PM        CT STROKE BRAIN (NO IV)(CPT=70450)    Result Date: 7/16/2024  CONCLUSION:  1. No acute intracranial hemorrhage or hydrocephalus. 2. Mild age indeterminate small vessel ischemic disease. If there is clinical concern for acute ischemia/infarction, an MRI of the brain would be recommended for further evaluation.  Critical results were discussed with Dr. Gr at 1643 hours on 7/16/2024. Critical results were read back.   LOCATION:  [facility]   Dictated by (CST): Stromberg, LeRoy, MD on 7/16/2024 at 4:39 PM     Finalized by (CST): Stromberg, LeRoy, MD on 7/16/2024 at 4:44 PM          Labs:  Recent Labs   Lab 07/16/24 1628 07/17/24  0452 07/19/24  0816   RBC 4.35 3.40* 3.21*   HGB 13.6 10.5* 9.9*   HCT 39.3 30.9* 29.3*   MCV 90.3 90.9 91.3   MCH 31.3 30.9 30.8   MCHC 34.6 34.0 33.8   RDW 12.9 12.9 12.7   NEPRELIM 3.22  --  2.27   WBC 6.1 4.4 3.6*   .0 101.0* 90.0*         Recent Labs   Lab 07/16/24 1628 07/17/24  0452 07/19/24  0816   GLU 99 84 87   BUN 20 15 13   CREATSERUM 1.22 0.94 0.92   EGFRCR 59* 81 83   CA 9.1 8.3* 8.5*    139 140   K 3.7 4.1  4.1 3.9    110 109   CO2 26.8 26.0 28.0     Assessment & Plan:    An 84 year old male with:     Stroke with left sided weakness and slurred speech, secondary to R M1 occlusion as seen on CTA, concerning for acute stroke in a setting of risk factors of htn, hl, dm2, cad, arrhythmia and dilated cardiomyopathy. Back to baseline, complete resolution of symptoms.  CT/CTA with R m1 occlusion   S/p TNK and emergency endovascular mechanical thrombectomy with tici 2b revascularization complicated by right cervical ICA non-flow limiting dissection.  Repeat CT head 24 hours post TNK - stable  No MRI brain as patient's pacemaker/defibrillator is possibly conditional/incompatible - [facility] records requested by cardiology. It would not change the therapy plan.   Echo w bubbles - no shunt, no thrombus. EF 25-30%.   Stroke labs: A1c 5.5, lipid panel with LDL  137. Was not on any blood thinners, antiplatelets or anti lipids at home. On atorvastatin 40 mg daily,  mg daily. Continue for stroke prophylaxis.   PT/OT/ST/ rehab   Hx of dementia - continue namenda, seroquel and VPA.  Hx of HTN, HLD, CAD, arrhythmia/dilated cardiomyopathy - may resume home meds, SBP goal per [facility] 100-160, EF on echo 25-30%, further management per Cardiology  Discussed with patient and family. Discussed with dr. Rogers. No further inpatient neurological intervention indicated at this time. Patient is to follow up with [facility] in 1 month and with Stroke clinic, dr. Petersen in 4 -6 weeks.  Is this a shared or split note between Advanced Practice Provider and Physician? Yes       Brooke Rush [facility]  7/19/2024, 9:52 AM   [facility] # [id number]    Impression/plan/MDM:  Patient seen and examined personally.  Investigations reviewed.    Left-sided weakness and slurring of speech, status post IV TNK and endovascular mechanical thrombectomy with tici 2b revascularization, c/b right ICA and nonflow limiting dissection.  Currently stable.  Awaiting to see if patient can have an MRI since he has a defibrillator/pacemaker.  Echocardiogram reported ejection fraction of 25 to 30%.  No evidence of significant stenosis or thrombi noted.  , A1c 5.5.  Patient presently on aspirin 325 mg daily and atorvastatin 40 mg daily.  Advised OT/PT/rehab.  Dementia.  Continue Namenda, Seroquel and valproic acid as previously.  Post TNK CT scan unremarkable.    Patient and family member counseled.  Will continue to follow.        Overall time spent 35 minutes.  Greater than 50% time spent counseling

## 2024-07-19 NOTE — PROGRESS NOTES
[facility]  Neurosurgery Progress Note    Montrell Roberto Patient Status:  Inpatient    1940 MRN [id number]   Location [facility] 7NE-A Attending Oliver Milan MD   Hosp Day # 3 PCP Enma Clark MD     Chief Complaint:  Acute stroke    Subjective:  No events overnight. No complaints. MRI pending due to PPM coordination.    Objective/Physical Exam:    Vital Signs:  Blood pressure 110/69, pulse 59, temperature 97.9 °F (36.6 °C), temperature source Temporal, resp. rate 18, weight 133 lb 13.1 oz (60.7 kg), SpO2 94%.  Respiratory:  nonlabored  CV:  well perfused  General: NAD, Speech Fluent, Mood Appropriate  Neurologic:   A&Ox3  PERRL, EOMi, FS, TM  Full strength x 4, no drift      Labs:  Recent Labs   Lab 24  1628 24  0452   RBC 4.35 3.40*   HGB 13.6 10.5*   HCT 39.3 30.9*   MCV 90.3 90.9   MCH 31.3 30.9   MCHC 34.6 34.0   RDW 12.9 12.9   NEPRELIM 3.22  --    WBC 6.1 4.4   .0 101.0*       Recent Labs   Lab 24  1628 24  0452   GLU 99 84   BUN 20 15   CREATSERUM 1.22 0.94   EGFRCR 59* 81   CA 9.1 8.3*    139   K 3.7 4.1  4.1    110   CO2 26.8 26.0       Imaging:  CT BRAIN OR HEAD (79372)    Result Date: 2024  CONCLUSION:  1. No acute intracranial hemorrhage. 2. Mild to moderate age indeterminate small vessel ischemic disease. If there is clinical concern for acute ischemia/infarction, an MRI of the brain would be recommended for further evaluation.    LOCATION:  [id number]   Dictated by (CST): Stromberg, LeRoy, MD on 2024 at 4:16 PM     Finalized by (CST): Stromberg, LeRoy, MD on 2024 at 4:21 PM       IR INTRA ARTERIAL STROKE INTERVENTION    Result Date: 2024  CEREBRAL ANGIOGRAM PROCEDURE REPORT  PATIENT NAME: Montrell Roberto  DATE OF PROCEDURE: 2024  ATTENDING: Ambrosio Godoy MD  PREOPERATIVE DIAGNOSIS: 1. Right M1 occluison  POSTOPERATIVE DIAGNOSIS: 1. Right M1 occlusion  OPERATION: 1. Diagnostic cerebral angiogram  2. Ultrasound guided arterial access 3. Right middle cerebral artery mechanical thrombectomy  ANESTHESIA: MAC administered by anesthesiology  COMPLICATIONS: none  INDICATIONS: The patient is an 84 year old male who presented with acute onset left sided weakness and was found to have a right M1 occlusion. He was administered TNK. See medical record for further detail.   PROCEDURE: Following explanation of the benefits, risks and alternatives for the procedure, informed consent was obtained from the patient's daughter who is his power of . The risks including but not limited to stroke, intracranial hemorrhage, vascular injury to the cervical or femoral vessels, groin hematoma, and death were discussed with the patient, his wife, and his daughter. A time-out was performed. Both groins were prepped in the usual sterile fashion using Chloroprep, and sterilely draped. Using ultrasound guidance (permanent image saved), a single wall puncture of the right femoral artery was performed; and a 8-Fr short sheath was inserted into the right common femoral artery. The sheath was then flushed with sterile flushes in the usual fashion.  Using coaxial technique, a Walrus balloon guide catheter with a Winchester-2 select catheter were advanced into the aortic arch, and with the aid of the roadmapping, digital fluoroscopy, and careful guidewire manipulation the right common carotid and right internal carotid arteries were catheterized. Upon each successive selective catheterization, digital subtraction angiography using the appropriate rate and volume of contrast in multiple projections was performed.   FINDINGS:  RIGHT COMMON CAROTID ARTERY (ROADMAP, PA, LATERAL, CERVICAL) The origins of the right internal and external carotid arteries are widely patent without evidence of ulceration or stenosis. The visualized portions of the external carotid artery and its branches are normal without evidence of ulceration or stenosis. There  is no evidence of arteriovenous shunting.  RIGHT INTERNAL CAROTID ARTERY (DSA, PA, LATERAL, HEAD) There is a right M1 occlusion.  ENDOVASCULAR INTERVENTION: With the Walrus balloon guide catheter in the distal cervical ICA, the Simmon-2 catheter was removed.  A 6-Fr Eleonora aspiration catheter, a Marksman microcatheter, and Michael 024 microwire were then coaxially advanced through the guide catheter into the right internal carotid artery.  The microcatheter and microwire were advanced to the point of occlusion, and the aspiration catheter was advanced to the face of the clot.  The microcatheter and microwire were removed and the aspiration catheter was placed on continuous suction.  The catheter was allowed to intercalate with the thrombus, the balloon was inflated, and the aspiration catheter was removed.  The balloon was deflated and a repeat right ICA angiogram demonstrated TICI2b revascularization.   Repeat right ICA angiograms demonstrated two small M3 branch occlusions feeding in the right frontal lobe with retrograde filling of that territory from pial collateral vessels.  The guide catheter was retracted into the common carotid artery and a repeat angiogram of the neck demonstrated a non-flow limiting Biffl grade 1 dissection of the proximal right cervical internal carotid artery.  The Walrus was removed.  The sheath was aspirated and a right femoral artery angiogram was performed which demonstrated puncture above the bifurcation and below the inferior epigastric.  The femoral sheath was then removed and hemostasis was achieved with an 8-Fr Angioseal closure device. The patient tolerated the procedure well and was transported from the angiography suite to the ICU in stable condition.  CONCLUSION Angiographic study demonstrates: 1. Right M1 occlusion with TICI 2b revascularization following one aspiration thrombectomy pass 2. Proximal right cervical internal carotid artery non-flow limiting dissection    Dictated by (CST): Ambrosio Godoy DR on 7/16/2024 at 8:49 PM     Finalized by (CST): Ambrosio Godoy DR on 7/16/2024 at 8:56 PM       XR CHEST AP PORTABLE  (CPT=71045)    Result Date: 7/16/2024  PROCEDURE:  XR CHEST AP PORTABLE  (CPT=71045)  TECHNIQUE:  AP chest radiograph was obtained.  COMPARISON:  None.  INDICATIONS:  stroke  PATIENT STATED HISTORY: (As transcribed by Technologist)     FINDINGS: Cardiac silhouette and pulmonary vasculature are unremarkable.  Left-sided pacemaker and leads are noted. No consolidation, pleural effusion or pneumothorax. IMPRESSION: Lateral portion of the left side of the chest is excluded.  No consolidation.   LOCATION:  [facility]      Dictated by (CST): Live Little MD on 7/16/2024 at 8:48 PM     Finalized by (CST): Live Little MD on 7/16/2024 at 8:48 PM       CT BRAIN OR HEAD ([id number])    Result Date: 7/16/2024  PROCEDURE:  CT BRAIN OR HEAD ([id number])  COMPARISON:  [facility] , CT, CT STROKE (ALEX) CTA BRAIN/CTA NECK+PERF(CPT=70496/83724/0042T), 7/16/2024, 4:40 PM.  INDICATIONS:  post stroke intervention  TECHNIQUE:  Noncontrast CT scanning is performed through the brain. Dose reduction techniques were used. Dose information is transmitted to the ACR (American College of Radiology) NRDR (National Radiology Data Registry) which includes the Dose Index Registry.  PATIENT STATED HISTORY: (As transcribed by Technologist)  Patient is status post IR stroke intervention.    FINDINGS: No evidence of intracranial hemorrhage or extra-axial fluid collection. Lucencies in the deep periventricular white matter are likely sequelae of chronic small vessel ischemic disease. Prominence of the lateral and 3rd ventricles is noted.  Mild prominence of the sulci. Contrast from recent examinations is noted.  There appears to be interval opacification of the right M1 segment along with multiple right M2 branches. Visualized portions of paranasal sinuses are unremarkable. Visualized portions of  the mastoid air cells are unremarkable. Visualized portions of the orbits are unremarkable. IMPRESSION: 1. No evidence of intracranial hemorrhage or extra-axial fluid collection.  Gray-white differentiation is relatively symmetric.  2. Though this is a noncontrast examination, the patient has contrast on board from recent examinations.  The previously noted occlusion at right M1 segment appears to have been treated.  There is interval opacification of right M1 M2 branches when compared to prior exam.     LOCATION:  [facility]   Dictated by (CST): Live Little MD on 7/16/2024 at 7:23 PM     Finalized by (CST): Live Littel MD on 7/16/2024 at 7:25 PM       CT STROKE (ALEX) CTA BRAIN/CTA NECK+PERF(CPT=70496/72886/0042T)    Result Date: 7/16/2024  CONCLUSION:   1. No significant stenosis or aneurysmal dilatation involving the major arterial structures in the neck.  2. Obstruction of the mid right M1 segment with associated marked perfusion abnormality suggesting marked ischemia throughout the entire right MCA territory.  T-max greater than 6 seconds is 148 mL. Mismatch volume is 137 mL.  This critical result was discussed with Dr. Gr at 1708 hours on 7/16/2024. Read back was performed.    LOCATION:  [facility]   Dictated by ([facility]): Live Little MD on 7/16/2024 at 5:03 PM     Finalized by (CST): Live Little MD on 7/16/2024 at 5:08 PM       CT STROKE BRAIN (NO IV)(CPT=70450)    Result Date: 7/16/2024  CONCLUSION:  1. No acute intracranial hemorrhage or hydrocephalus. 2. Mild age indeterminate small vessel ischemic disease. If there is clinical concern for acute ischemia/infarction, an MRI of the brain would be recommended for further evaluation.  Critical results were discussed with Dr. Gr at 1643 hours on 7/16/2024. Critical results were read back.   LOCATION:  [facility]   Dictated by ([facility]): Stromberg, LeRoy, MD on 7/16/2024 at 4:39 PM     Finalized by (CST): Stromberg, LeRoy, MD on 7/16/2024 at 4:44 PM            Assessment:  85 yo male with acute ischemic stroke from right M1 occlusion  AMANDA cervical dissection following thrombectomy    Plan:  S/p mechanical thrombectomy  Medical management/ stroke work up per [facility]  MRI brain pending  Aspirin 325mg  DVT prophylaxis SCD, ok for DVT chemo prophylaxis  Follow up in 1 month   Will sign off from a Neurosurgical standpoint      Is this a shared or split note between Advanced Practice Provider and Physician? BROCK Best  [facility]  7/19/2024, 8:02 AM  [facility] [id number]

## 2024-07-19 NOTE — DISCHARGE INSTRUCTIONS
Sometimes managing your health at home requires assistance.  The [facility] team has recognized your preference to use [facility].  They can be reached by phone at [phone].  The fax number for your reference is [phone].  A representative from the home health agency will contact you or your family to schedule your first visit.

## 2024-07-19 NOTE — PROGRESS NOTES
Progress Note  Montrell Roberto Patient Status:  Inpatient    1940 MRN [id number]   Location [facility] 7NE-A Attending Oliver Milan MD   Hosp Day # 3 PCP Enma Clark MD     Subjective:  Pt resting comfortably in chair. Denies any chest pain or SOB.     Objective:  /69 (BP Location: Left arm)   Pulse 59   Temp 97.9 °F (36.6 °C) (Temporal)   Resp 18   Wt 133 lb 13.1 oz (60.7 kg)   SpO2 94%   BMI 22.97 kg/m²     Telemetry: AV paced, occasional PVC's  HR 60      Intake/Output:    Intake/Output Summary (Last 24 hours) at 2024 0956  Last data filed at 2024 0615  Gross per 24 hour   Intake 560 ml   Output 375 ml   Net 185 ml       Last 3 Weights   24 0100 133 lb 13.1 oz (60.7 kg)   24 1623 130 lb 4.7 oz (59.1 kg)   24 1415 129 lb (58.5 kg)       Labs:  Recent Labs   Lab 24  1628 24  0452 24  0816   GLU 99 84 87   BUN 20 15 13   CREATSERUM 1.22 0.94 0.92   EGFRCR 59* 81 83   CA 9.1 8.3* 8.5*    139 140   K 3.7 4.1  4.1 3.9    110 109   CO2 26.8 26.0 28.0     Recent Labs   Lab 24  1628 24  0452 24  0816   RBC 4.35 3.40* 3.21*   HGB 13.6 10.5* 9.9*   HCT 39.3 30.9* 29.3*   MCV 90.3 90.9 91.3   MCH 31.3 30.9 30.8   MCHC 34.6 34.0 33.8   RDW 12.9 12.9 12.7   NEPRELIM 3.22  --  2.27   WBC 6.1 4.4 3.6*   .0 101.0* 90.0*         Recent Labs   Lab 24  1628   TROPHS 8.22     Lab Results   Component Value Date    INR 1.05 2024       Diagnostics:   Echo 2024: Conclusions:   1. Left ventricle: The cavity size was mildly increased. Wall thickness was normal. Systolic function was reduced. The estimated ejection fraction was 25-30%, by visual assessment. Probable hypokinesis of the anteroseptal and anterior walls. Left ventricular diastolic function parameters were normal for the patient's age.   2. Right ventricle: The cavity size was mildly to moderately increased.  Pacer C/W ICD noted in the right  ventricle. Systolic function was mildly reduced.   3. Left atrium: The atrium was moderately to markedly dilated.   4. Right atrium: The atrium was moderately dilated. Pacer C/W ICD noted in right atrium.   5. Aortic valve: There was moderate regurgitation directed centrally in the LVOT.   6. Mitral valve: There was moderate regurgitation, directed centrally.   7. Pulmonary arteries: Systolic pressure was within the normal range, estimated to be 25mm Hg.   8. Atrial septum: Agitated saline contrast study showed no atrial level shunt, at baseline or with provocation.   Impressions:  No previous study was available for comparison.     Review of Systems   Respiratory: Negative.     Cardiovascular: Negative.        Physical Exam:    Physical Exam  Vitals reviewed.   Constitutional:       General: He is not in acute distress.     Appearance: He is obese. He is not ill-appearing.   Neck:      Vascular: No JVD.   Cardiovascular:      Rate and Rhythm: Normal rate and regular rhythm.      Pulses: Normal pulses.      Heart sounds: Normal heart sounds. No murmur heard.     No friction rub. No gallop.   Pulmonary:      Effort: No respiratory distress.      Breath sounds: No stridor. No wheezing or rhonchi.      Comments: Fine crackles on bases    Chest:      Chest wall: No tenderness.   Abdominal:      Palpations: Abdomen is soft.   Musculoskeletal:      Cervical back: Normal range of motion.      Right lower leg: No edema.      Left lower leg: No edema.   Neurological:      Mental Status: He is alert and oriented to person, place, and time.   Psychiatric:         Mood and Affect: Mood normal.         Behavior: Behavior normal.         Medications:   mupirocin  1 Application Nasal BID    aspirin  325 mg Oral Nightly    ketotifen  1 drop Both Eyes BID    atorvastatin  40 mg Oral Nightly    amiodarone  200 mg Oral Daily    divalproex DR  125 mg Oral TID    melatonin  3 mg Oral Nightly    memantine  10 mg Oral BID    mexiletine   150 mg Oral TID    QUEtiapine  25 mg Oral Nightly    [Held by provider] metoprolol tartrate  25 mg Oral BID    QUEtiapine  12.5 mg Oral Every afternoon at 1400      sodium chloride 100 mL/hr at 07/17/24 2030    niCARdipine         Assessment/Plan:   Pt presented with left sided weakness    Acute ischemic stroke from M1 occlusion  - s/p AMANDA cervical dissection following thrombectomy 7/16/24  - CT head post procedure : stable  - symptoms resolved  - neurosurgery on following  - on aspirin per neurology    HFrEF  - echo from 7/17 with LVEF 25-30%, (echo from 2015 with LVEF 45-50%) LV cavity size increased, probable hypokinesis of anteroseptal and anterior walls. Moderate MR, no atrial level shunt, IVC normal sized.  - Chest xray: unremarkable   - on BB, lasix PTA- now on hold due to marginal hypotension and bradycardia    S/p BiV ICD  h/o polymorphic VT -  per chart review   - s/p endocardial ablation and ICD placement in 2/2013  - device interrogation s/p placement with NSVT and 10 ATP's, pt underwent endo/epicardial ablation in 3/2013  - device interrogation from yesterday showed pt had NSVT episodes x2 with ATP on 7/8 and 7/9.   - on amiodarone and mexiletine    CAD  - trop x1 negative  - on statin and aspirin    HTN  - blood pressures soft  - BP goal of 100-160 per neurology    HLD  -   - on statin    Plan:  - continue on mixelitine and amiodarone  - Awaiting records from pt's primary cardiologist for the recent progress notes, echo , stress test and cardiac angiogram reports  - antiplatelet and BP recommendations per neuro  - continue on statin   - may need to consider starting GDMT if blood pressure allows     Resmi BROCK Hernandez  [facility]  7/19/2024  9:56 AM      Addendum:1624  RN notified pt complaining of chest pain. Denies any SOB. EKG AV paced, ordered for trop x1  Assessed pt, pt stated the pain to the chest is to the pacemaker site. It some times goes to the left axilla.  Denies any SOB or diaphoresis with pain. Per Wife at bedside pt had history of  pain the pacemaker site.     Reviewed records from pt's primary cardiologist  - Echo from 06/2023 with LVEF 40-45%,  diffuse hypokinesis, RV cavity size increased, septal motion dyssynergic   - Cath 6/9/23 LM 0%, LAD dense proximal calcium merge 50%, Lcx mid high OM1 prox 50%, RCA 10%    Consider low dose BB if blood pressure   improves during weekend.    Discussed above plan with Dr Perez

## 2024-07-20 VITALS
HEART RATE: 59 BPM | TEMPERATURE: 98 F | SYSTOLIC BLOOD PRESSURE: 105 MMHG | DIASTOLIC BLOOD PRESSURE: 66 MMHG | WEIGHT: 133.81 LBS | RESPIRATION RATE: 14 BRPM | OXYGEN SATURATION: 96 % | BODY MASS INDEX: 23 KG/M2

## 2024-07-20 LAB
ANION GAP SERPL CALC-SCNC: 3 MMOL/L (ref 0–18)
BASOPHILS # BLD AUTO: 0.04 X10(3) UL (ref 0–0.2)
BASOPHILS NFR BLD AUTO: 1 %
BUN BLD-MCNC: 14 MG/DL (ref 9–23)
CALCIUM BLD-MCNC: 8.6 MG/DL (ref 8.7–10.4)
CHLORIDE SERPL-SCNC: 108 MMOL/L (ref 98–112)
CO2 SERPL-SCNC: 28 MMOL/L (ref 21–32)
CREAT BLD-MCNC: 0.91 MG/DL
EGFRCR SERPLBLD CKD-EPI 2021: 84 ML/MIN/1.73M2 (ref 60–?)
EOSINOPHIL # BLD AUTO: 0.17 X10(3) UL (ref 0–0.7)
EOSINOPHIL NFR BLD AUTO: 4.3 %
ERYTHROCYTE [DISTWIDTH] IN BLOOD BY AUTOMATED COUNT: 12.7 %
GLUCOSE BLD-MCNC: 111 MG/DL (ref 70–99)
GLUCOSE BLD-MCNC: 82 MG/DL (ref 70–99)
GLUCOSE BLD-MCNC: 86 MG/DL (ref 70–99)
HCT VFR BLD AUTO: 30.6 %
HGB BLD-MCNC: 10.5 G/DL
IMM GRANULOCYTES # BLD AUTO: 0.01 X10(3) UL (ref 0–1)
IMM GRANULOCYTES NFR BLD: 0.3 %
LYMPHOCYTES # BLD AUTO: 0.87 X10(3) UL (ref 1–4)
LYMPHOCYTES NFR BLD AUTO: 21.9 %
MCH RBC QN AUTO: 30.3 PG (ref 26–34)
MCHC RBC AUTO-ENTMCNC: 34.3 G/DL (ref 31–37)
MCV RBC AUTO: 88.4 FL
MONOCYTES # BLD AUTO: 0.43 X10(3) UL (ref 0.1–1)
MONOCYTES NFR BLD AUTO: 10.8 %
NEUTROPHILS # BLD AUTO: 2.45 X10 (3) UL (ref 1.5–7.7)
NEUTROPHILS # BLD AUTO: 2.45 X10(3) UL (ref 1.5–7.7)
NEUTROPHILS NFR BLD AUTO: 61.7 %
OSMOLALITY SERPL CALC.SUM OF ELEC: 288 MOSM/KG (ref 275–295)
PLATELET # BLD AUTO: 93 10(3)UL (ref 150–450)
POTASSIUM SERPL-SCNC: 3.8 MMOL/L (ref 3.5–5.1)
RBC # BLD AUTO: 3.46 X10(6)UL
SODIUM SERPL-SCNC: 139 MMOL/L (ref 136–145)
WBC # BLD AUTO: 4 X10(3) UL (ref 4–11)

## 2024-07-20 PROCEDURE — 99233 SBSQ HOSP IP/OBS HIGH 50: CPT | Performed by: OTHER

## 2024-07-20 PROCEDURE — 99239 HOSP IP/OBS DSCHRG MGMT >30: CPT | Performed by: HOSPITALIST

## 2024-07-20 RX ORDER — ATORVASTATIN CALCIUM 40 MG/1
40 TABLET, FILM COATED ORAL DAILY
Qty: 30 TABLET | Refills: 0 | Status: SHIPPED | OUTPATIENT
Start: 2024-07-20

## 2024-07-20 RX ORDER — POTASSIUM CHLORIDE 20 MEQ/1
40 TABLET, EXTENDED RELEASE ORAL ONCE
Status: COMPLETED | OUTPATIENT
Start: 2024-07-20 | End: 2024-07-20

## 2024-07-20 NOTE — PROGRESS NOTES
Patient seen and examined.  No new complaints feeling much better.  Daughter available at the time of visit.      Neuro exam:  Awake, alert, responsive, nondysarthric, no aphasia  Pupils round react light extraocular moods intact no gross facial weakness seen tongue midline.  Motor strength upper and lower extremities 5 out of 5.      Impression/plan/MDM:  Patient seen and examined personally.  Investigations reviewed.     Left-sided weakness and slurring of speech, status post IV TNK and endovascular mechanical thrombectomy with tici 2b revascularization, c/b right ICA and nonflow limiting dissection.  Currently stable.  Unable to get MRI.  Echocardiogram reported ejection fraction of 25 to 30%.  No evidence of significant stenosis or thrombi noted.  , A1c 5.5.  Patient presently on aspirin 325 mg daily and atorvastatin 40 mg daily.  Advised OT/PT/rehab.  Dementia.  Continue Namenda, Seroquel and valproic acid as previously.  Post TNK CT scan unremarkable.    Patient and daughter counseled all questions answered.  Patient to follow-up with neurology services in 3 to 4 weeks.          Overall time spent 35 minutes.  Greater than 50% time spent counseling

## 2024-07-20 NOTE — PROGRESS NOTES
[facility]  Cardiology Progress Note    Montrell Roberto Patient Status:  Inpatient    1940 MRN [id number]   Location [facility] 7NE-A Attending Oliver Milan MD   Hosp Day # 4 PCP Enma Clark MD       Subjective:  No acute events overnight  Feels okay today    --------------------------------------------------------------------------------------------------------------------------------  ROS 12 systems reviewed and at baseline, pertinent findings above.      History:  Past Medical History:    Age-related nuclear cataract, bilateral    Anxiety    Cataract    Chronic kidney disease (CKD), stage III (moderate) (HCC)    Dementia (HCC)    Dry eye    H/O cardiac radiofrequency ablation    @ [facility]    Heart disease    History of permanent cardiac pacemaker placement    Hyperlipidemia    Macular pucker    Posterior vitreous detachment    Ptosis     Past Surgical History:   Procedure Laterality Date    Cardiac defibrillator placement      Cardiac pacemaker placement      [employer]    Open heart surgical profile  2012     Family History   Problem Relation Age of Onset    Cataracts Other       reports that he has never smoked. He has never used smokeless tobacco. He reports that he does not drink alcohol and does not use drugs.    Objective:   Temp: 97.7 °F (36.5 °C)  Pulse: 60  Resp: 11  BP: 113/58    Intake/Output:     Intake/Output Summary (Last 24 hours) at 2024 0811  Last data filed at 2024 0600  Gross per 24 hour   Intake 120 ml   Output 825 ml   Net -705 ml       Physical Exam:  General: NAD.  HEENT: Normocephalic.  Neck: Supple.  Cardiac: RRR. S1, S2 normal. No murmur.  Lungs: GAEB.  Extremities: No edema.    Tele AV paced    Assessment:    Acute ischemic CVA. M1 occlusion s/p thrombectomy  HFrEF s/p BiV ICD  Hx of PVT  CAD  HTN  DLP    Plan:  Presentation with acute CVA.  Improved following neurosurgical intervention with thrombectomy.   Compensated cardiac  status. TTE w/ LVEF 25-30%, which is probably not significantly different from prior. He does require optimization of his GDMT. Start metoprolol today. Plan to add losartan/MRA/SGLT2 inh if tolerated on outpt basis  Continue amiodarone  Pt lost to follow up with cardiology. Used to go to [facility]. Requesting trx to [facility]. Need outpt follow up with EP (interrogation noted ATP for VT early July), and general cardiology  Okay for DC    Discussed with patient. Questions answered.    Please call with questions.     Level of care: L3    Ambar Rubio MD  Interventional Cardiology  [facility]    7/20/2024  8:11 AM

## 2024-07-20 NOTE — DISCHARGE SUMMARY
[place] HOSPITALIST  DISCHARGE SUMMARY     Montrell Roberto Patient Status:  Inpatient    1940 MRN [id number]   Location [facility] 7NE-A Attending No att. providers found   Hosp Day # 4 PCP Enma Clark MD     Date of Admission: 2024  Date of Discharge:  2024     Discharge Disposition: Home Health Care Services    Discharge Diagnosis:  #acute MCA right M1 occlusion s/p TNK and IR thrombectomy  -symptoms resolved  -BP goals and timing aspirin deferred to neuroCC/neuroIR  -Statin  -A1c 5.5  -  -no MRI due to ppm     #dementia     #hx dilated cardiomyopathy with ablation and AICD/ppm  #chronic systolic CHF  -hold BB  -echo ef 25-30%, hypokinesis. No shunt  -previous echo  w/ EF 45%  -cards consult  -ppm interrogation  -records reviewed from [facility]: - Echo from 2023 with LVEF 40-45%,  diffuse hypokinesis, RV cavity size increased, septal motion dyssynergic   - Cath 23 LM 0%, LAD dense proximal calcium merge 50%, Lcx mid high OM1 prox 50%, RCA 10%     #eyelid droop -2/2 CVA?  -feels like sand in eye for the past few weeks. Possibly allergy? Can try pataday eye gtts.      #anemia  -dilution     #cad  #HTN    History of Present Illness: Montrell Roberto is a 84 year old male with stroke like symptoms that started while in [employer]'s drive thru at 330[place].  She noticed he was having droopy eyes and grasping at things in the air.  She drove home and noticed she couldn't get him out of the car, he was weak on left and was not speaking.  On way to hospital, he was very somnolent.  Now back to nearly baseline.       Brief Synopsis: pt came in w/ acute CVA. Given TNK and underwent successful thrombectomy. Sx's completely resolved after procedure. Unable to do MRI due to ppm. Echo showed drop in his EF to 25-30%. Pt moved to the area and will transition to cardiology here. Meds adjusted and will f/u cards as outpt. Ok to DC home.     Lace+ Score: 71  59-90 High Risk  29-58  Medium Risk  0-28   Low Risk       TCM Follow-Up Recommendation:  LACE > 58: High Risk of readmission after discharge from the hospital.      Procedures during hospitalization:   thrombectomy    Incidental or significant findings and recommendations (brief descriptions):  none    Lab/Test results pending at Discharge:   none    Consultants:  Neuro, IR, cards    Discharge Medication List:     Discharge Medications        START taking these medications        Instructions Prescription details   aspirin 325 MG Tabs      Take 1 tablet (325 mg total) by mouth at bedtime.   Quantity: 30 tablet  Refills: 1            CHANGE how you take these medications        Instructions Prescription details   amiodarone 200 MG Tabs  Commonly known as: Pacerone  What changed: Another medication with the same name was removed. Continue taking this medication, and follow the directions you see here.      Take 1 tablet (200 mg total) by mouth daily.   Refills: 0     metoprolol tartrate 25 MG Tabs  Commonly known as: Lopressor  What changed: Another medication with the same name was removed. Continue taking this medication, and follow the directions you see here.      Take 1 tablet (25 mg total) by mouth 2 (two) times daily.   Refills: 0            CONTINUE taking these medications        Instructions Prescription details   acetaminophen 500 MG Tabs  Commonly known as: Tylenol Extra Strength      Take 1 tablet (500 mg total) by mouth in the morning, at noon, and at bedtime.   Refills: 0     atorvastatin 40 MG Tabs  Commonly known as: Lipitor      Take 1 tablet (40 mg total) by mouth daily.   Quantity: 30 tablet  Refills: 0     Cholecalciferol 125 MCG (5000 UT) Tabs  Commonly known as: VITAMIN D-3      Take 1 tablet (5,000 Units total) by mouth daily.   Refills: 0     cyanocobalamin 1000 MCG Tabs  Commonly known as: Vitamin B12      Take 1 tablet (1,000 mcg total) by mouth daily.   Refills: 0     divalproex  MG Tbec DR tab  Commonly  known as: Depakote      Take 1 tablet (125 mg total) by mouth 3 (three) times daily.   Refills: 0     docusate sodium 100 MG Caps  Commonly known as: Colace      Take 1 capsule (100 mg total) by mouth 2 (two) times daily.   Refills: 0     furosemide 20 MG Tabs  Commonly known as: Lasix      Take 1 tablet (20 mg total) by mouth daily.   Refills: 0     gabapentin 300 MG Caps  Commonly known as: Neurontin      Take 300 mg by mouth 3 (three) times daily.   Refills: 0     melatonin 3 MG Tabs      Take 1 tablet (3 mg total) by mouth nightly.   Refills: 0     memantine 10 MG Tabs  Commonly known as: Namenda      Take 1 tablet (10 mg total) by mouth 2 (two) times daily.   Refills: 0     mexiletine 150 MG Caps  Commonly known as: Mexitil      Take 1 capsule (150 mg total) by mouth 3 (three) times daily.   Refills: 0     ofloxacin 0.3 % Soln  Commonly known as: Floxin      Place 5 drops into the right ear 2 (two) times daily for 5 days.   Stop taking on: July 21, 2024  Quantity: 5 mL  Refills: 0     Potassium Chloride ER 10 MEQ Tbcr      Take 1 tablet (10 mEq total) by mouth daily.   Refills: 0     QUEtiapine ER 50 MG Tb24  Commonly known as: SEROquel XR      Take 1 tablet (50 mg total) by mouth nightly.   Refills: 0     QUEtiapine 25 MG Tabs  Commonly known as: SEROquel      Take 1 tablet (25 mg total) by mouth nightly.   Refills: 0               Where to Get Your Medications        These medications were sent to [id number] - [place] [address] [facility] [address], [phone], [phone]  [facility] [place] [address]      Phone: [phone]   aspirin 325 MG Tabs  atorvastatin 40 MG Tabs         [facility] reviewed: yes    Follow-up appointment:   Mariann Wagner, APRN  [address]  [facility] [address] [phone]    Follow up in 1 month(s)      Enma Clark MD  [address]  [facility] [address] [phone]    Schedule an appointment as soon as  possible for a visit      Leroy Petersen MD  [address]  [facility] [address]  [phone]    Go in 1 month(s)  stroke follow up    Ronald Watkins MD  [address] [address]  [phone]    Follow up in 1 week(s)  Office will call you for follow up appt.    Appointments for Next 30 Days 2024 - 2024      None            Vital signs:  Temp:  [97.7 °F (36.5 °C)-98 °F (36.7 °C)] 98 °F (36.7 °C)  Pulse:  [59-61] 59  Resp:  [8-17] 14  BP: (105-113)/(55-66) 105/66  SpO2:  [95 %-99 %] 96 %    Physical Exam:    General: No acute distress   Lungs: clear to auscultation  Cardiovascular: S1, S2  Abdomen: Soft      -----------------------------------------------------------------------------------------------  PATIENT DISCHARGE INSTRUCTIONS: See electronic chart    Oliver Milan MD    Total time spent on discharge plannin minutes     The  Cures Act makes medical notes like these available to patients in the interest of transparency. Please be advised this is a medical document. Medical documents are intended to carry relevant information, facts as evident, and the clinical opinion of the practitioner. The medical note is intended as peer to peer communication and may appear blunt or direct. It is written in medical language and may contain abbreviations or verbiage that are unfamiliar.

## 2024-07-20 NOTE — PLAN OF CARE
A&Ox3-4, forgetful.  Tele Avpaced, on room air  Neuros checks intact. See flowsheet  Denied pain to PM site, chronic, declines meds.  Discharge is planned for home with home health services  Comfort and safety rounds done. Call light is within reach   Patient and family updated on POC and discharge plan.

## 2024-07-22 LAB
ATRIAL RATE: 60 BPM
P-R INTERVAL: 186 MS
Q-T INTERVAL: 512 MS
QRS DURATION: 178 MS
QTC CALCULATION (BEZET): 512 MS
R AXIS: 14 DEGREES
T AXIS: 115 DEGREES
VENTRICULAR RATE: 60 BPM

## 2024-07-23 ENCOUNTER — PATIENT OUTREACH (OUTPATIENT)
Dept: CASE MANAGEMENT | Age: 84
End: 2024-07-23

## 2024-07-23 NOTE — PROGRESS NOTES
1st attempt [facility] apt request  No answer, LVMTCB to schedule apts    STROKE -order  CARDS  PCP  NEURO SURG

## 2024-07-24 NOTE — PROGRESS NOTES
2nd attempt [facility] apt request  No answer, LVMTCB to schedule apts    STROKE -order -unable to contact  CARDS -unable to contact  PCP -unable to contact  NEURO SURG -unable to contact  Closing encounter

## 2024-07-26 ENCOUNTER — TELEPHONE (OUTPATIENT)
Dept: NEUROLOGY | Facility: CLINIC | Age: 84
End: 2024-07-26

## 2024-07-26 NOTE — TELEPHONE ENCOUNTER
States that patient is unavailable this week and wants to be seen next week. States no call back necessary    Forwarded to nurses

## 2024-07-29 ENCOUNTER — TELEPHONE (OUTPATIENT)
Dept: MEDSURG UNIT | Facility: HOSPITAL | Age: 84
End: 2024-07-29

## 2024-07-29 ENCOUNTER — TELEPHONE (OUTPATIENT)
Dept: NEUROLOGY | Facility: CLINIC | Age: 84
End: 2024-07-29

## 2024-07-29 NOTE — TELEPHONE ENCOUNTER
[facility] orders placed in Dr Petersen's folder for review and signature.   SN 1WK2,[id number], [id number]  PT 1WK1  OT 1WK1

## 2024-07-30 ENCOUNTER — TELEPHONE (OUTPATIENT)
Dept: NEUROLOGY | Facility: CLINIC | Age: 84
End: 2024-07-30

## 2024-07-30 NOTE — TELEPHONE ENCOUNTER
[facility] PT re-schedule of evaluation to week of 7/28/2024 placed in provider's folder for review and signature.  PT EFFECTIVE 7/28/2024 1WK1.

## 2024-08-01 NOTE — TELEPHONE ENCOUNTER
Received Order #[id number] back from provider. Faxed to [facility]. Confirmation received. Endorsed to Scanning.

## 2024-08-06 ENCOUNTER — TELEPHONE (OUTPATIENT)
Dept: NEUROLOGY | Facility: CLINIC | Age: 84
End: 2024-08-06

## 2024-08-06 NOTE — TELEPHONE ENCOUNTER
Message left on Estee's voice mail that if patient is refusing the occupational therapy, it is his choice.

## 2024-08-06 NOTE — TELEPHONE ENCOUNTER
[facility] requesting verbal orders to cancel occupational therapy per Pt's request. Best cb #[phone]. Endorsed to RN for Provider.

## 2024-08-07 ENCOUNTER — TELEPHONE (OUTPATIENT)
Dept: NEUROLOGY | Facility: CLINIC | Age: 84
End: 2024-08-07

## 2024-08-07 NOTE — TELEPHONE ENCOUNTER
[facility] notes/orders placed in Dr Petersen's folder for review and signature.  SN EFFECTIVE 8/4/2024 1WK2, [id number], [id number].  OTPlan: Patient declined OT- discharged.

## 2024-08-12 NOTE — TELEPHONE ENCOUNTER
[facility] notes/orders reviewed and signed by Dr Petersen, faxed to [facility] with fax confirmation received and sent to scanning.

## 2024-08-14 ENCOUNTER — TELEPHONE (OUTPATIENT)
Dept: NEUROLOGY | Facility: CLINIC | Age: 84
End: 2024-08-14

## 2024-08-16 NOTE — TELEPHONE ENCOUNTER
Received signed order from provider. Faxed to [facility] with confirmation received. Endorsed to scanning.

## 2024-09-18 ENCOUNTER — TELEPHONE (OUTPATIENT)
Dept: NEUROLOGY | Facility: CLINIC | Age: 84
End: 2024-09-18

## 2024-10-17 ENCOUNTER — TELEPHONE (OUTPATIENT)
Dept: MEDSURG UNIT | Facility: HOSPITAL | Age: 84
End: 2024-10-17

## 2024-10-17 NOTE — PROGRESS NOTES
90 DAYS STROKE FOLLOW-UP DATA COLLECTION    Patient Name: Montrell Roberto Date: 10/17/2024   Date of hospital admission: 07/16/2024 Date of hospital discharge: 07/20/2024   Stroke: Ischemic  Discharge disposition: Home with HH     Patient is s/p TNK/Thrombectomy  90-day MRS: 3, baseline     **Spoke with pt's Helena LEVINE, answered all questions, discussed MRS**

## 2024-12-19 ENCOUNTER — ORDER TRANSCRIPTION (OUTPATIENT)
Dept: CARDIAC REHAB | Facility: HOSPITAL | Age: 84
End: 2024-12-19

## 2024-12-19 DIAGNOSIS — I50.9 HF (HEART FAILURE) (HCC): Primary | ICD-10-CM

## 2024-12-30 ENCOUNTER — CARDPULM VISIT (OUTPATIENT)
Dept: CARDIAC REHAB | Facility: HOSPITAL | Age: 84
End: 2024-12-30
Attending: INTERNAL MEDICINE
Payer: MEDICARE

## 2025-01-13 ENCOUNTER — CARDPULM VISIT (OUTPATIENT)
Dept: CARDIAC REHAB | Facility: HOSPITAL | Age: 85
End: 2025-01-13
Attending: INTERNAL MEDICINE
Payer: MEDICARE

## 2025-01-13 PROCEDURE — 93798 PHYS/QHP OP CAR RHAB W/ECG: CPT

## 2025-01-20 ENCOUNTER — CARDPULM VISIT (OUTPATIENT)
Age: 85
End: 2025-01-20
Attending: INTERNAL MEDICINE
Payer: MEDICARE

## 2025-01-20 PROCEDURE — 93798 PHYS/QHP OP CAR RHAB W/ECG: CPT

## 2025-01-21 ENCOUNTER — OFFICE VISIT (OUTPATIENT)
Facility: LOCATION | Age: 85
End: 2025-01-21
Payer: MEDICARE

## 2025-01-21 DIAGNOSIS — R09.82 POST-NASAL DRAINAGE: Primary | ICD-10-CM

## 2025-01-21 PROCEDURE — 99203 OFFICE O/P NEW LOW 30 MIN: CPT | Performed by: OTOLARYNGOLOGY

## 2025-01-21 RX ORDER — IPRATROPIUM BROMIDE 42 UG/1
2 SPRAY, METERED NASAL 2 TIMES DAILY
Qty: 1 EACH | Refills: 5 | Status: SHIPPED | OUTPATIENT
Start: 2025-01-21

## 2025-01-22 ENCOUNTER — CARDPULM VISIT (OUTPATIENT)
Age: 85
End: 2025-01-22
Attending: INTERNAL MEDICINE
Payer: MEDICARE

## 2025-01-22 PROCEDURE — 93798 PHYS/QHP OP CAR RHAB W/ECG: CPT

## 2025-01-22 NOTE — PROGRESS NOTES
Domingo Cruz MD  1/21/2025  6:19 PMLawrenreji Roberto is a 84 year old male. No chief complaint on file.    HPI:   They moved to a new house in June.  He has had a chronic runny nose.  He gets postnasal drip.  He tried Allegra without relief.  He denies headaches or infections.  Current Outpatient Medications   Medication Sig Dispense Refill    ipratropium 0.06 % Nasal Solution 2 sprays by Nasal route 2 (two) times daily. 1 each 5    atorvastatin 40 MG Oral Tab Take 1 tablet (40 mg total) by mouth daily. 30 tablet 0    aspirin 325 MG Oral Tab Take 1 tablet (325 mg total) by mouth at bedtime. 30 tablet 1    acetaminophen 500 MG Oral Tab Take 1 tablet (500 mg total) by mouth in the morning, at noon, and at bedtime. (Patient not taking: Reported on 12/30/2024)      amiodarone 200 MG Oral Tab Take 1 tablet (200 mg total) by mouth daily.      divalproex  MG Oral Tab EC DR tab Take 1 tablet (125 mg total) by mouth 3 (three) times daily.      furosemide 20 MG Oral Tab Take 1 tablet (20 mg total) by mouth daily.      memantine 10 MG Oral Tab Take 1 tablet (10 mg total) by mouth 2 (two) times daily.      metoprolol tartrate 25 MG Oral Tab Take 1 tablet (25 mg total) by mouth 2 (two) times daily.      mexiletine 150 MG Oral Cap Take 1 capsule (150 mg total) by mouth 3 (three) times daily.      Potassium Chloride ER 10 MEQ Oral Tab CR Take 1 tablet (10 mEq total) by mouth daily.      cyanocobalamin 1000 MCG Oral Tab Take 1 tablet (1,000 mcg total) by mouth daily. (Patient not taking: Reported on 12/30/2024)      Cholecalciferol 125 MCG (5000 UT) Oral Tab Take 1 tablet (5,000 Units total) by mouth daily. (Patient not taking: Reported on 12/30/2024)      QUEtiapine 25 MG Oral Tab Take 1 tablet (25 mg total) by mouth 2 (two) times daily. Patient taking 1/2 tab at noon and 1 at bedtime        Past Medical History:    Age-related nuclear cataract, bilateral    Anxiety    Cataract    Chronic kidney disease (CKD),  stage III (moderate) (HCC)    Dementia (HCC)    Dry eye    H/O cardiac radiofrequency ablation    @ [facility]    Heart disease    History of permanent cardiac pacemaker placement    Hyperlipidemia    Macular pucker    Posterior vitreous detachment    Ptosis      Social History:  Social History     Socioeconomic History    Marital status:    Tobacco Use    Smoking status: Never    Smokeless tobacco: Never   Substance and Sexual Activity    Alcohol use: No    Drug use: No     Social Drivers of Health     Financial Resource Strain: Low Risk  (7/29/2021)    [facility]    Overall Financial Resource Strain (CARDIA)     Difficulty of Paying Living Expenses: Not hard at all   Food Insecurity: No Food Insecurity (7/17/2024)    Food Insecurity     Food Insecurity: Never true   Transportation Needs: No Transportation Needs (7/17/2024)    Transportation Needs     Lack of Transportation: No   Housing Stability: Low Risk  (7/17/2024)    Housing Stability     Housing Instability: No      Past Surgical History:   Procedure Laterality Date    Cardiac defibrillator placement      Cardiac pacemaker placement      [employer]    Open heart surgical profile  2012         REVIEW OF SYSTEMS:   GENERAL HEALTH: feels well otherwise  GENERAL : denies fever, chills, sweats, weight loss, weight gain  SKIN: denies any unusual skin lesions or rashes  RESPIRATORY: denies shortness of breath with exertion  NEURO: denies headaches    EXAM:   There were no vitals taken for this visit.    System Findings Details   Constitutional  Overall appearance - Normal.   Psychiatric  Orientation - Oriented to time, place, person & situation. Appropriate mood and affect.   Head/Face  Facial features -- Normal. Skull - Normal.   Eyes  Pupils equal ,round ,react to light and accomidate   Ears, Nose, Throat, Neck  Ears clear nose healthy mucosa oropharynx clear neck no masses   Neurological  Memory - Normal. Cranial nerves -  Cranial nerves II through XII grossly intact.   Lymph Detail  Submental. Submandibular. Anterior cervical. Posterior cervical. Supraclavicular.       ASSESSMENT AND PLAN:   1. Post-nasal drainage  Likely due to vasomotor rhinitis.  He will try Atrovent nasal spray and see me back if he has not improved.      The patient indicates understanding of these issues and agrees to the plan.    No follow-ups on file.    Domingo Cruz MD  1/21/2025  6:19 PM

## 2025-01-24 ENCOUNTER — CARDPULM VISIT (OUTPATIENT)
Age: 85
End: 2025-01-24
Attending: INTERNAL MEDICINE
Payer: MEDICARE

## 2025-01-24 PROCEDURE — 93798 PHYS/QHP OP CAR RHAB W/ECG: CPT

## 2025-01-27 ENCOUNTER — APPOINTMENT (OUTPATIENT)
Age: 85
End: 2025-01-27
Attending: INTERNAL MEDICINE
Payer: MEDICARE

## 2025-01-29 ENCOUNTER — APPOINTMENT (OUTPATIENT)
Age: 85
End: 2025-01-29
Attending: INTERNAL MEDICINE
Payer: MEDICARE

## 2025-01-31 ENCOUNTER — APPOINTMENT (OUTPATIENT)
Age: 85
End: 2025-01-31
Attending: INTERNAL MEDICINE
Payer: MEDICARE

## 2025-02-03 ENCOUNTER — APPOINTMENT (OUTPATIENT)
Age: 85
End: 2025-02-03
Attending: INTERNAL MEDICINE
Payer: MEDICARE

## 2025-02-05 ENCOUNTER — APPOINTMENT (OUTPATIENT)
Age: 85
End: 2025-02-05
Attending: INTERNAL MEDICINE
Payer: MEDICARE

## 2025-02-07 ENCOUNTER — APPOINTMENT (OUTPATIENT)
Age: 85
End: 2025-02-07
Attending: INTERNAL MEDICINE
Payer: MEDICARE

## 2025-02-10 ENCOUNTER — APPOINTMENT (OUTPATIENT)
Age: 85
End: 2025-02-10
Attending: INTERNAL MEDICINE
Payer: MEDICARE

## 2025-02-12 ENCOUNTER — APPOINTMENT (OUTPATIENT)
Age: 85
End: 2025-02-12
Attending: INTERNAL MEDICINE
Payer: MEDICARE

## 2025-02-14 ENCOUNTER — APPOINTMENT (OUTPATIENT)
Age: 85
End: 2025-02-14
Attending: INTERNAL MEDICINE
Payer: MEDICARE

## 2025-02-17 ENCOUNTER — APPOINTMENT (OUTPATIENT)
Age: 85
End: 2025-02-17
Attending: INTERNAL MEDICINE
Payer: MEDICARE

## 2025-02-19 ENCOUNTER — APPOINTMENT (OUTPATIENT)
Age: 85
End: 2025-02-19
Attending: INTERNAL MEDICINE
Payer: MEDICARE

## 2025-02-21 ENCOUNTER — APPOINTMENT (OUTPATIENT)
Age: 85
End: 2025-02-21
Attending: INTERNAL MEDICINE
Payer: MEDICARE

## 2025-02-24 ENCOUNTER — APPOINTMENT (OUTPATIENT)
Age: 85
End: 2025-02-24
Attending: INTERNAL MEDICINE
Payer: MEDICARE

## 2025-02-26 ENCOUNTER — APPOINTMENT (OUTPATIENT)
Age: 85
End: 2025-02-26
Attending: INTERNAL MEDICINE
Payer: MEDICARE

## 2025-02-28 ENCOUNTER — APPOINTMENT (OUTPATIENT)
Age: 85
End: 2025-02-28
Attending: INTERNAL MEDICINE
Payer: MEDICARE

## 2025-03-03 ENCOUNTER — APPOINTMENT (OUTPATIENT)
Age: 85
End: 2025-03-03
Attending: INTERNAL MEDICINE
Payer: MEDICARE

## 2025-03-05 ENCOUNTER — APPOINTMENT (OUTPATIENT)
Age: 85
End: 2025-03-05
Attending: INTERNAL MEDICINE
Payer: MEDICARE

## 2025-03-07 ENCOUNTER — APPOINTMENT (OUTPATIENT)
Age: 85
End: 2025-03-07
Attending: INTERNAL MEDICINE
Payer: MEDICARE

## 2025-03-10 ENCOUNTER — APPOINTMENT (OUTPATIENT)
Age: 85
End: 2025-03-10
Attending: INTERNAL MEDICINE
Payer: MEDICARE

## 2025-03-12 ENCOUNTER — APPOINTMENT (OUTPATIENT)
Age: 85
End: 2025-03-12
Attending: INTERNAL MEDICINE
Payer: MEDICARE

## 2025-03-14 ENCOUNTER — APPOINTMENT (OUTPATIENT)
Age: 85
End: 2025-03-14
Attending: INTERNAL MEDICINE
Payer: MEDICARE

## 2025-03-17 ENCOUNTER — APPOINTMENT (OUTPATIENT)
Age: 85
End: 2025-03-17
Attending: INTERNAL MEDICINE
Payer: MEDICARE

## 2025-03-19 ENCOUNTER — APPOINTMENT (OUTPATIENT)
Age: 85
End: 2025-03-19
Attending: INTERNAL MEDICINE
Payer: MEDICARE

## 2025-03-21 ENCOUNTER — APPOINTMENT (OUTPATIENT)
Age: 85
End: 2025-03-21
Attending: INTERNAL MEDICINE
Payer: MEDICARE

## 2025-03-24 ENCOUNTER — APPOINTMENT (OUTPATIENT)
Age: 85
End: 2025-03-24
Attending: INTERNAL MEDICINE
Payer: MEDICARE

## 2025-03-26 ENCOUNTER — APPOINTMENT (OUTPATIENT)
Age: 85
End: 2025-03-26
Attending: INTERNAL MEDICINE
Payer: MEDICARE

## 2025-03-28 ENCOUNTER — APPOINTMENT (OUTPATIENT)
Age: 85
End: 2025-03-28
Attending: INTERNAL MEDICINE
Payer: MEDICARE

## 2025-03-31 ENCOUNTER — APPOINTMENT (OUTPATIENT)
Age: 85
End: 2025-03-31
Attending: INTERNAL MEDICINE
Payer: MEDICARE

## 2025-04-02 ENCOUNTER — APPOINTMENT (OUTPATIENT)
Age: 85
End: 2025-04-02
Attending: INTERNAL MEDICINE
Payer: MEDICARE

## 2025-04-04 ENCOUNTER — APPOINTMENT (OUTPATIENT)
Age: 85
End: 2025-04-04
Attending: INTERNAL MEDICINE
Payer: MEDICARE

## 2025-04-07 ENCOUNTER — APPOINTMENT (OUTPATIENT)
Age: 85
End: 2025-04-07
Attending: INTERNAL MEDICINE
Payer: MEDICARE

## 2025-04-09 ENCOUNTER — APPOINTMENT (OUTPATIENT)
Age: 85
End: 2025-04-09
Attending: INTERNAL MEDICINE
Payer: MEDICARE

## 2025-04-11 ENCOUNTER — APPOINTMENT (OUTPATIENT)
Age: 85
End: 2025-04-11
Attending: INTERNAL MEDICINE
Payer: MEDICARE

## 2025-04-14 ENCOUNTER — APPOINTMENT (OUTPATIENT)
Age: 85
End: 2025-04-14
Attending: INTERNAL MEDICINE
Payer: MEDICARE

## 2025-05-19 ENCOUNTER — TELEPHONE (OUTPATIENT)
Dept: NEUROLOGY | Facility: CLINIC | Age: 85
End: 2025-05-19

## (undated) NOTE — LETTER
[address]  [address]    Consent for Anesthesia    IMontrell agree to be cared for by a physician anesthesiologist alone and/or with a nurse anesthetist, who is specially trained to monitor me and give me medicine to put me to sleep or keep me comfortable during my procedure    I understand that my anesthesiologist and/or anesthetist is not an employee or agent of [facility] or [employer]. He or she works for [employer].    As the patient asking for anesthesia services, I agree to:  Allow the anesthesiologist (anesthesia doctor) to give me medicine and do additional procedures as necessary. Some examples are: Starting or using an “IV” to give me medicine, fluids or blood during my procedure, and having a breathing tube placed to help me breathe when I’m asleep (intubation). In the event that my heart stops working properly, I understand that my anesthesiologist will make every effort to sustain my life, unless otherwise directed by [facility] Do Not Resuscitate documents.  Tell my anesthesia doctor before my procedure:   If I am pregnant.   The last time that I ate or drank.  iii. All of the medicines I take (including prescriptions, herbal supplements, and pills I can buy without a prescription (including street drugs/illegal medications). Failure to inform my anesthesiologist about these medicines may increase my risk of anesthetic complications.  Iv.If I am allergic to anything or have had a reaction to anesthesia before.  I understand how the anesthesia medicine will help me (benefits).  I understand that with any type of anesthesia medicine there are risks:  The most common risks are: nausea, vomiting, sore throat, muscle soreness, damage to my eyes, mouth, or teeth (from breathing tube placement).  Rare risks include: remembering what happened during my procedure, allergic reactions to medications, injury to my airway,  heart, lungs, vision, nerves, or muscles and in extremely rare instances death.  My doctor has explained to me other choices available to me for my care (alternatives).  Pregnant Patients (“epidural”):  I understand that the risks of having an epidural (medicine given into my back to help control pain during labor), include itching, low blood pressure, difficulty urinating, headache or slowing of the baby’s heart. Very rare risks include infection, bleeding, seizure, irregular heart rhythms and nerve injury.  Regional Anesthesia (“spinal”, “epidural”, & “nerve blocks”):  I understand that rare but potential complications include headache, bleeding, infection, seizure, irregular heart rhythms, and nerve injury.    _____________________________________________________________________________  Patient (or Representative) Signature/Relationship to Patient  Date   Time    _____________________________________________________________________________   Name (if used)    Language/Organization   Time    _____________________________________________________________________________  Nurse Anesthetist Signature     Date   Time    ______________________________________________________________________________  Anesthesiologist Signature     Date   Time  I have discussed the procedure and information above with the patient (or patient’s representative) and answered their questions. The patient or their representative has agreed to have anesthesia services.    _____________________________________________________________________________  Witness       Date   Time  I have verified that the signature is that of the patient or patient’s representative, and that it was signed before the procedure    Patient Name: Montrell Roberto     : 1940                 Printed: 2024 at 5:02 PM    Medical Record #: [id number]                                            Page 1 of 1